# Patient Record
Sex: FEMALE | Race: WHITE | NOT HISPANIC OR LATINO | Employment: UNEMPLOYED | URBAN - METROPOLITAN AREA
[De-identification: names, ages, dates, MRNs, and addresses within clinical notes are randomized per-mention and may not be internally consistent; named-entity substitution may affect disease eponyms.]

---

## 2022-04-10 ENCOUNTER — HISTORICAL (OUTPATIENT)
Dept: ADMINISTRATIVE | Facility: HOSPITAL | Age: 47
End: 2022-04-10

## 2022-04-25 VITALS
DIASTOLIC BLOOD PRESSURE: 70 MMHG | HEIGHT: 63 IN | BODY MASS INDEX: 22.89 KG/M2 | WEIGHT: 129.19 LBS | SYSTOLIC BLOOD PRESSURE: 140 MMHG

## 2022-09-27 PROBLEM — R42 VERTIGO: Status: ACTIVE | Noted: 2022-09-27

## 2022-09-29 ENCOUNTER — PATIENT MESSAGE (OUTPATIENT)
Dept: FAMILY MEDICINE | Facility: CLINIC | Age: 47
End: 2022-09-29
Payer: MEDICAID

## 2022-10-06 ENCOUNTER — NURSE TRIAGE (OUTPATIENT)
Dept: ADMINISTRATIVE | Facility: CLINIC | Age: 47
End: 2022-10-06
Payer: MEDICAID

## 2022-10-06 PROBLEM — J01.00 ACUTE MAXILLARY SINUSITIS: Status: ACTIVE | Noted: 2022-10-06

## 2022-10-07 NOTE — TELEPHONE ENCOUNTER
Patient states history of CVA in January, 2021 and discharge with new prescriptions. Patient states she continues to use discharge prescriptions and called to inquire if she should continue to take them. Patient's Provider is not an [facility] Provider. Patient states her PCP continues to provide refills of her medications in question and she has a follow-up visit scheduled for December, 2022.    Care Advice given to discuss medication regimen with her PCP during follow-up visit or contact for an earlier visit to discuss medications. Patient states understanding of care advice.   Reason for Disposition   [1] Caller has NON-URGENT medicine question about med that PCP prescribed AND [2] triager unable to answer question    Additional Information   Negative: [1] Intentional drug overdose AND [2] suicidal thoughts or ideas   Negative: Drug overdose and triager unable to answer question   Negative: Caller requesting a renewal or refill of a medicine patient is currently taking   Negative: Caller requesting information unrelated to medicine   Negative: Caller requesting information about COVID-19 Vaccine   Negative: Caller requesting information about Emergency Contraception   Negative: Caller requesting information about Combined Birth Control Pills   Negative: Caller requesting information about Progestin Birth Control Pills   Negative: Caller requesting information about Post-Op pain or medicines   Negative: Caller requesting a prescription antibiotic (such as Penicillin) for Strep throat and has a positive culture result   Negative: Caller requesting a prescription anti-viral med (such as Tamiflu) and has influenza (flu) symptoms   Negative: Immunization reaction suspected   Negative: Rash while taking a medicine or within 3 days of stopping it   Negative: [1] Asthma and [2] having symptoms of asthma (cough, wheezing, etc.)   Negative: [1] Symptom of illness (e.g., headache, abdominal pain, earache, vomiting) AND [2]  more than mild   Negative: Breastfeeding questions about mother's medicines and diet   Negative: MORE THAN A DOUBLE DOSE of a prescription or over-the-counter (OTC) drug   Negative: [1] DOUBLE DOSE (an extra dose or lesser amount) of prescription drug AND [2] any symptoms (e.g., dizziness, nausea, pain, sleepiness)   Negative: [1] DOUBLE DOSE (an extra dose or lesser amount) of over-the-counter (OTC) drug AND [2] any symptoms (e.g., dizziness, nausea, pain, sleepiness)   Negative: Took another person's prescription drug   Negative: [1] DOUBLE DOSE (an extra dose or lesser amount) of prescription drug AND [2] NO symptoms (Exception: a double dose of antibiotics)   Negative: Diabetes drug error or overdose (e.g., took wrong type of insulin or took extra dose)   Negative: [1] Prescription not at pharmacy AND [2] was prescribed by PCP recently (Exception: triager has access to EMR and prescription is recorded there. Go to Home Care and confirm for pharmacy.)   Negative: [1] Pharmacy calling with prescription question AND [2] triager unable to answer question   Negative: [1] Caller has URGENT medicine question about med that PCP or specialist prescribed AND [2] triager unable to answer question   Negative: Medicine patch causing local rash or itching   Negative: [1] Caller has medicine question about med NOT prescribed by PCP AND [2] triager unable to answer question (e.g., compatibility with other med, storage)   Negative: Prescription request for new medicine (not a refill)    Protocols used: Medication Question Call-A-

## 2022-10-11 ENCOUNTER — OFFICE VISIT (OUTPATIENT)
Dept: PRIMARY CARE CLINIC | Facility: CLINIC | Age: 47
End: 2022-10-11
Payer: MEDICAID

## 2022-10-11 DIAGNOSIS — H65.93 FLUID LEVEL BEHIND TYMPANIC MEMBRANE OF BOTH EARS: ICD-10-CM

## 2022-10-11 DIAGNOSIS — J01.80 ACUTE NON-RECURRENT SINUSITIS OF OTHER SINUS: Primary | ICD-10-CM

## 2022-10-11 PROCEDURE — 99213 PR OFFICE/OUTPT VISIT, EST, LEVL III, 20-29 MIN: ICD-10-PCS | Mod: 95,,, | Performed by: FAMILY MEDICINE

## 2022-10-11 PROCEDURE — 99213 OFFICE O/P EST LOW 20 MIN: CPT | Mod: 95,,, | Performed by: FAMILY MEDICINE

## 2022-10-11 RX ORDER — METHYLPREDNISOLONE 4 MG/1
TABLET ORAL
Qty: 30 TABLET | Refills: 0 | Status: SHIPPED | OUTPATIENT
Start: 2022-10-11 | End: 2022-11-07

## 2022-12-28 ENCOUNTER — OFFICE VISIT (OUTPATIENT)
Dept: PRIMARY CARE CLINIC | Facility: CLINIC | Age: 47
End: 2022-12-28
Payer: MEDICAID

## 2022-12-28 DIAGNOSIS — J06.9 URI, ACUTE: Primary | ICD-10-CM

## 2022-12-28 PROCEDURE — 99212 PR OFFICE/OUTPT VISIT, EST, LEVL II, 10-19 MIN: ICD-10-PCS | Mod: 95,,, | Performed by: FAMILY MEDICINE

## 2022-12-28 PROCEDURE — 99212 OFFICE O/P EST SF 10 MIN: CPT | Mod: 95,,, | Performed by: FAMILY MEDICINE

## 2022-12-28 NOTE — PROGRESS NOTES
The patient location is: [place]  The chief complaint leading to consultation is: sinus infection    Visit type: audiovisual    Face to Face time with patient:   8 minutes of total time spent on the encounter, which includes face to face time and non-face to face time preparing to see the patient (eg, review of tests), Obtaining and/or reviewing separately obtained history, Documenting clinical information in the electronic or other health record, Independently interpreting results (not separately reported) and communicating results to the patient/family/caregiver, or Care coordination (not separately reported).         Each patient to whom he or she provides medical services by telemedicine is:  (1) informed of the relationship between the physician and patient and the respective role of any other health care provider with respect to management of the patient; and (2) notified that he or she may decline to receive medical services by telemedicine and may withdraw from such care at any time.    Notes:    History of present illness:  Patient is a 47-year-old female who presents with acute onset of sore throat, low-grade subjective fever a little over 100°,  dry cough, runny nose, and nasal congestion 5 days ago.  The fever lasted only 2 days.  She denies facial pain.  She had 2- home COVID test.  She has been taking aleve and cough suppressant.    ROS:  No muscle aches or shortness for breath      Physical exam:  General: Alert, no acute distress   HEENT:  NC/AT, no facial swelling of deformity  Neck:  supple  Resp:  Normal effort, no respiratory distress   Psych:  Normal affect     Diagnoses and all orders for this visit:    URI, acute  Patient reassured.  No antibiotics warranted at this time.  Recommended Flonase, antihistamines and Sudafed as needed for nasal symptoms.   Answers submitted by the patient for this visit:  Ear Pain Questionnaire (Submitted on 12/28/2022)  Chief Complaint: Ear pain  Affected ear:  left  Chronicity: new  Onset: in the past 7 days  Progression since onset: waxing and waning  Frequency: hourly  Fever: no fever  Fever duration: less than 1 day  Pain - numeric: 7/10  abdominal pain: No  ear discharge: No  rash: No  cough: No  headaches: No  rhinorrhea: Yes  diarrhea: No  hearing loss: No  sore throat: Yes  neck pain: No  drainage: No  Treatments tried: NSAIDs  Improvement on treatment: mild  Pain severity: mild  chronic ear infection: No  hearing loss: No  tympanostomy tube: Yes

## 2023-01-09 PROBLEM — J01.00 ACUTE MAXILLARY SINUSITIS: Status: RESOLVED | Noted: 2022-10-06 | Resolved: 2023-01-09

## 2023-01-10 ENCOUNTER — OFFICE VISIT (OUTPATIENT)
Dept: PRIMARY CARE CLINIC | Facility: CLINIC | Age: 48
End: 2023-01-10
Payer: MEDICAID

## 2023-01-10 DIAGNOSIS — J01.81 OTHER ACUTE RECURRENT SINUSITIS: Primary | ICD-10-CM

## 2023-01-10 DIAGNOSIS — J30.89 NON-SEASONAL ALLERGIC RHINITIS DUE TO OTHER ALLERGIC TRIGGER: ICD-10-CM

## 2023-01-10 PROCEDURE — 99213 OFFICE O/P EST LOW 20 MIN: CPT | Mod: 95,,, | Performed by: FAMILY MEDICINE

## 2023-01-10 PROCEDURE — 99213 PR OFFICE/OUTPT VISIT, EST, LEVL III, 20-29 MIN: ICD-10-PCS | Mod: 95,,, | Performed by: FAMILY MEDICINE

## 2023-01-10 RX ORDER — FLUTICASONE PROPIONATE 50 MCG
2 SPRAY, SUSPENSION (ML) NASAL DAILY
Qty: 16 G | Refills: 2 | Status: SHIPPED | OUTPATIENT
Start: 2023-01-10 | End: 2023-02-09

## 2023-01-10 RX ORDER — CETIRIZINE HYDROCHLORIDE 10 MG/1
10 TABLET ORAL DAILY
Qty: 90 TABLET | Refills: 3 | Status: SHIPPED | OUTPATIENT
Start: 2023-01-10 | End: 2024-01-10

## 2023-01-10 NOTE — PROGRESS NOTES
Subjective:    The patient location is: [facility]  Visit type: Virtual visit with synchronous audio and video  Total time spent with patient: 20 mins  Each patient to whom he or she provides medical services by telemedicine is:  (1) informed of the relationship between the physician and patient and the respective role of any other health care provider with respect to management of the patient; and (2) notified that he or she may decline to receive medical services by telemedicine and may withdraw from such care at any time.     Patient ID: Saba Potts is a 47 y.o. female.    Chief Complaint: Sinusitis infection.        History of Present Illness:   Saba Potts 47 y.o. female presents today with    Sinusitis: Patient presents with sinusitis. The patient reports that symptom has been present for one week.  Symptoms include nasal congestion with yellow drainage and rhinorrhea,  subjective fever. Denies sinus pressure, tenderness, facial pain, headache, nasal tenderness, ear fullness, cough, sore throat and sneezing. This is a recurrent problem. Treatment so far includes-  None  Home covid testing for the past 3 days has been negative  No History of allergic rhinitis, asthma and COPD.  Denies  dizziness and ill contact.   She has a PCP-Dr Trinh in [place] that takes care of her medical problems including history of CVA with right sided deficit. She has 6 months routine follow up and her BP is controlled.  I have reviewed all of the patient's clinical history available in care everywhere and Epic and have utilized this in my evaluation and management recommendations today.   No past medical history on file.  No family history on file.  Social History     Socioeconomic History    Marital status: Single     Social Determinants of Health     Financial Resource Strain: Unknown    Difficulty of Paying Living Expenses: Patient refused   Food Insecurity: Unknown    Worried About Running Out of Food in  the Last Year: Patient refused    Ran Out of Food in the Last Year: Patient refused   Transportation Needs: Unknown    Lack of Transportation (Medical): Patient refused    Lack of Transportation (Non-Medical): Patient refused   Physical Activity: Insufficiently Active    Days of Exercise per Week: 2 days    Minutes of Exercise per Session: 20 min   Stress: No Stress Concern Present    Feeling of Stress : Only a little   Social Connections: Unknown    Frequency of Communication with Friends and Family: Patient refused    Frequency of Social Gatherings with Friends and Family: Patient refused    Active Member of Clubs or Organizations: Patient refused    Attends Club or Organization Meetings: Patient refused    Marital Status: Patient refused   Housing Stability: Unknown    Unable to Pay for Housing in the Last Year: Patient refused    Unstable Housing in the Last Year: Patient refused     Outpatient Encounter Medications as of 1/10/2023   Medication Sig Dispense Refill    cetirizine (ZYRTEC) 10 MG tablet Take 1 tablet (10 mg total) by mouth once daily. 90 tablet 3    fluticasone propionate (FLONASE) 50 mcg/actuation nasal spray 2 sprays (100 mcg total) by Each Nostril route once daily. 16 g 2    meclizine (ANTIVERT) 25 mg tablet Take 1 tablet (25 mg total) by mouth 3 (three) times daily as needed for Dizziness or Nausea. 21 tablet 0    [DISCONTINUED] cetirizine (ZYRTEC) 10 MG tablet Take 1 tablet (10 mg total) by mouth once daily. 90 tablet 3     No facility-administered encounter medications on file as of 1/10/2023.       Review of Systems    Review of Systems      A complete 10 point ROS was completed and are positive as per above HPI.    Otherwise negative for fever, diplopia, chest pain, shortness of breath, vomiting, blood in urine, joint pain, skin rash, seizures and unusual bleeding.     Objective:      There were no vitals taken for this visit.  Physical Exam    CONSTITUTIONAL: No apparent distress. Appears  comfortable. Does not appear acutely ill or septic. Appears adequately hydrated.  CARDIOVASCULAR: No perioral cyanosis  PULMONARY: Breathing unlabored. No retractions Chest expansion grossly normal.  PSYCHIATRIC: Alert and conversant and grossly oriented. Mood is grossly neutral. Affect appropriate. Judgment and insight grossly intact.  NEUROLOGIC: No focal sensory deficits reported.   No results found for this or any previous visit.  Assessment:       1. Other acute recurrent sinusitis    2. Non-seasonal allergic rhinitis due to other allergic trigger        Plan:   Other acute recurrent sinusitis  -     fluticasone propionate (FLONASE) 50 mcg/actuation nasal spray; 2 sprays (100 mcg total) by Each Nostril route once daily.  Dispense: 16 g; Refill: 2  -     cetirizine (ZYRTEC) 10 MG tablet; Take 1 tablet (10 mg total) by mouth once daily.  Dispense: 90 tablet; Refill: 3    Non-seasonal allergic rhinitis due to other allergic trigger                    Viral infection, no abx waranted.              -  Recommended supportive care (rest, increase fluids, cough med, gargle with warm salt water, Alternate Tylenol and Motrin every 8 hours as needed for fever, aches, or pain)              -   Wash hands frequently              -  Discussed return or see PCP if symptoms greater than 7 days, change in mucous color, fever)   See PCP for BP             -  Treatment options and alternatives were discussed with the patient. Patient was given ample time to ask questions. All questions were answered. Voices understanding and acceptance of this advice. Will call back if any further questions or concerns.  Perri Rm MD

## 2023-05-09 ENCOUNTER — NURSE TRIAGE (OUTPATIENT)
Dept: ADMINISTRATIVE | Facility: CLINIC | Age: 48
End: 2023-05-09
Payer: MEDICAID

## 2023-05-09 NOTE — TELEPHONE ENCOUNTER
Lab work and urine test was done on last week. Pt would like to know what's the meaning of her results. Informed pt she would need to speak with Md office.   Reason for Disposition   Lab result questions   Caller requesting lab results  (Exception: Routine or non-urgent lab result.)    Protocols used: Information Only Call-A-AH, PCP Call - No Triage-A-AH

## 2023-06-05 DIAGNOSIS — Z12.31 OTHER SCREENING MAMMOGRAM: Primary | ICD-10-CM

## 2023-06-08 ENCOUNTER — HOSPITAL ENCOUNTER (OUTPATIENT)
Dept: RADIOLOGY | Facility: HOSPITAL | Age: 48
Discharge: HOME | End: 2023-06-08
Attending: PEDIATRICS
Payer: MEDICAID

## 2023-06-08 DIAGNOSIS — Z12.31 OTHER SCREENING MAMMOGRAM: ICD-10-CM

## 2023-06-08 PROCEDURE — 77067 SCR MAMMO BI INCL CAD: CPT | Mod: TC

## 2023-06-08 PROCEDURE — 77063 MAMMO DIGITAL SCREENING BILAT WITH TOMO: ICD-10-PCS | Mod: 26,,, | Performed by: STUDENT IN AN ORGANIZED HEALTH CARE EDUCATION/TRAINING PROGRAM

## 2023-06-08 PROCEDURE — 77063 BREAST TOMOSYNTHESIS BI: CPT | Mod: 26,,, | Performed by: STUDENT IN AN ORGANIZED HEALTH CARE EDUCATION/TRAINING PROGRAM

## 2023-06-08 PROCEDURE — 77067 SCR MAMMO BI INCL CAD: CPT | Mod: 26,,, | Performed by: STUDENT IN AN ORGANIZED HEALTH CARE EDUCATION/TRAINING PROGRAM

## 2023-06-08 PROCEDURE — 77067 MAMMO DIGITAL SCREENING BILAT WITH TOMO: ICD-10-PCS | Mod: 26,,, | Performed by: STUDENT IN AN ORGANIZED HEALTH CARE EDUCATION/TRAINING PROGRAM

## 2023-10-30 DIAGNOSIS — H91.90 HEARING LOSS: Primary | ICD-10-CM

## 2023-12-01 DIAGNOSIS — H90.3 SENSORY HEARING LOSS, BILATERAL: Primary | ICD-10-CM

## 2023-12-26 ENCOUNTER — HOSPITAL ENCOUNTER (OUTPATIENT)
Dept: RADIOLOGY | Facility: HOSPITAL | Age: 48
Discharge: HOME | End: 2023-12-26
Attending: FAMILY MEDICINE
Payer: MEDICAID

## 2023-12-26 DIAGNOSIS — H90.3 SENSORY HEARING LOSS, BILATERAL: ICD-10-CM

## 2023-12-26 PROCEDURE — 70553 MRI BRAIN STEM W/O & W/DYE: CPT | Mod: TC

## 2023-12-26 PROCEDURE — 25500020 PHARM REV CODE 255: Performed by: FAMILY MEDICINE

## 2023-12-26 PROCEDURE — A9577 INJ MULTIHANCE: HCPCS | Performed by: FAMILY MEDICINE

## 2023-12-26 RX ADMIN — GADOBENATE DIMEGLUMINE 10 ML: 529 INJECTION, SOLUTION INTRAVENOUS at 10:12

## 2024-01-29 ENCOUNTER — ON-DEMAND VIRTUAL (OUTPATIENT)
Dept: URGENT CARE | Facility: CLINIC | Age: 49
End: 2024-01-29
Payer: MEDICAID

## 2024-01-29 DIAGNOSIS — J06.9 VIRAL URI WITH COUGH: Primary | ICD-10-CM

## 2024-01-29 PROCEDURE — 99202 OFFICE O/P NEW SF 15 MIN: CPT | Mod: 95,,, | Performed by: NURSE PRACTITIONER

## 2024-01-29 RX ORDER — PREDNISONE 20 MG/1
20 TABLET ORAL DAILY
Qty: 3 TABLET | Refills: 0 | Status: SHIPPED | OUTPATIENT
Start: 2024-01-29 | End: 2024-02-01

## 2024-01-29 RX ORDER — AZELASTINE 1 MG/ML
1 SPRAY, METERED NASAL 2 TIMES DAILY
Qty: 30 ML | Refills: 0 | Status: SHIPPED | OUTPATIENT
Start: 2024-01-29 | End: 2025-01-28

## 2024-01-29 RX ORDER — GUAIFENESIN 600 MG/1
1200 TABLET, EXTENDED RELEASE ORAL 2 TIMES DAILY
Qty: 20 TABLET | Refills: 0 | Status: SHIPPED | OUTPATIENT
Start: 2024-01-29 | End: 2024-02-03

## 2024-01-29 RX ORDER — BENZONATATE 100 MG/1
100 CAPSULE ORAL 3 TIMES DAILY PRN
Qty: 21 CAPSULE | Refills: 0 | Status: SHIPPED | OUTPATIENT
Start: 2024-01-29 | End: 2024-02-05

## 2024-01-29 NOTE — PROGRESS NOTES
Subjective:      Patient ID: Saba Potts is a 48 y.o. female.    Vitals:  vitals were not taken for this visit.     Chief Complaint: Sinus Problem      Visit Type: TELE AUDIOVISUAL    Present with the patient at the time of consultation: TELEMED PRESENT WITH PATIENT: None    Past Medical History:   Diagnosis Date    Hyperlipidemia     Hypertension     Stroke      History reviewed. No pertinent surgical history.  Review of patient's allergies indicates:  No Known Allergies  Current Outpatient Medications on File Prior to Visit   Medication Sig Dispense Refill    cetirizine (ZYRTEC) 10 MG tablet Take 1 tablet (10 mg total) by mouth once daily. 90 tablet 3    meclizine (ANTIVERT) 25 mg tablet Take 1 tablet (25 mg total) by mouth 3 (three) times daily as needed for Dizziness or Nausea. 21 tablet 0     No current facility-administered medications on file prior to visit.     History reviewed. No pertinent family history.    Medications Ordered                [facility] - [address]   [address] [address]    Telephone: [phone]   Fax: [phone]   Hours: Not open 24 hours                         E-Prescribed (4 of 4)              azelastine (ASTELIN) 137 mcg (0.1 %) nasal spray    Si spray (137 mcg total) by Nasal route 2 (two) times daily.       Start: 24     Quantity: 30 mL Refills: 0                         benzonatate (TESSALON) 100 MG capsule    Sig: Take 1 capsule (100 mg total) by mouth 3 (three) times daily as needed for Cough.       Start: 24     Quantity: 21 capsule Refills: 0                         guaiFENesin (MUCINEX) 600 mg 12 hr tablet    Sig: Take 2 tablets (1,200 mg total) by mouth 2 (two) times daily. for 5 days       Start: 24     Quantity: 20 tablet Refills: 0                         predniSONE (DELTASONE) 20 MG tablet    Sig: Take 1 tablet (20 mg total) by mouth once daily. for 3 days       Start: 24     Quantity: 3 tablet  Refills: 0                           Ohs Peq Odvv Intake    1/29/2024  7:48 AM CST - Filed by Patient   What is your current physical address in the event of a medical emergency? [address] [place]   Are you able to take your vital signs? Yes   Systolic Blood Pressure: 127   Diastolic Blood Pressure: 83   Weight: 140   Height: 64   Pulse: 77   Temperature: 98   Respiration rate:    Pulse Oxygen: 99   Please attach any relevant images or files          Reports 1 week of postnasal drip, cough, and spitting up green phlegm. Denies fever/chills. Home covid test was negative. Took robitussin dm with some relief.    Sinus Problem  Associated symptoms include coughing and sneezing. Pertinent negatives include no chills, headaches or sore throat.       Constitution: Negative for chills and fever.   HENT:  Positive for postnasal drip. Negative for sinus pain and sore throat.    Respiratory:  Positive for cough and sputum production.    Allergic/Immunologic: Positive for sneezing.   Neurological:  Negative for headaches.        Objective:   The physical exam was conducted virtually.  Physical Exam   Constitutional: She is oriented to person, place, and time. No distress.   HENT:   Head: Normocephalic.   Nose: Congestion present.   Eyes: Conjunctivae are normal. No scleral icterus.   Pulmonary/Chest: Effort normal. No respiratory distress. She has no wheezes.   Musculoskeletal: Normal range of motion.         General: Normal range of motion.   Neurological: She is alert and oriented to person, place, and time.   Skin: Skin is not diaphoretic.   Psychiatric: Her behavior is normal. Judgment and thought content normal.       Assessment:     1. Viral URI with cough        Plan:       Viral URI with cough    Other orders  -     benzonatate (TESSALON) 100 MG capsule; Take 1 capsule (100 mg total) by mouth 3 (three) times daily as needed for Cough.  Dispense: 21 capsule; Refill: 0  -     guaiFENesin (MUCINEX) 600 mg 12 hr  "tablet; Take 2 tablets (1,200 mg total) by mouth 2 (two) times daily. for 5 days  Dispense: 20 tablet; Refill: 0  -     predniSONE (DELTASONE) 20 MG tablet; Take 1 tablet (20 mg total) by mouth once daily. for 3 days  Dispense: 3 tablet; Refill: 0  -     azelastine (ASTELIN) 137 mcg (0.1 %) nasal spray; 1 spray (137 mcg total) by Nasal route 2 (two) times daily.  Dispense: 30 mL; Refill: 0      Rest. Increase fluid intake.    Runny nose: Ensure you are blowing your nose frequently. It is better to get the nasal discharge out.    Cough: Cover the cough/cough into the elbow and wash hands often to prevent the spread of germs.  A cough may be the last symptom to go away and may persist for up to 4-6 weeks, so do not be alarmed.    Non-Pharmacological Interventions: Consider humidifiers, maintaining hydration, and elevating the head during sleep to alleviate symptoms.    Congestion: OTC nasal saline into each nostril 1-3 times daily. May also use flonase nasal spray. I especially like Xlear nasal spray to help with congestion.    There are a variety of oral OTC decongestants. Beware of decongestants containing phenylephrine. Studies have shown that these do not work to improve your symptoms.    Nasal decongestants (like Afrin) should only be taken for 1-3 days. If you use these longer than this, you are at high right for "rebound congestion" which means you will continue to be congested.    If headache or fever, take OTC tylenol or ibuprofen as needed per the instructions on the label.    Sneezing: Antihistamines (Claritin, Allegra, Benadryl, Zyrtec, Xyzal) help with these symptoms. Benadryl, Zyrtec, and Xyzal may cause drowsiness, so avoid operating any heavy machinery or driving while on these medications.    Follow-up with your PCP for recheck in 1-3 days.    All questions were answered to the best of my abilities and all concerns were addressed at this time.     Follow up:   1. Please schedule a virtual follow up " visit as needed.  2. Please see an in-person provider if your symptoms are worsening or not improving in 2-3 days.  3. Please print a copy of this note and send it to your regular doctor or take it to your next visit so it may be included in your medical record.   4. You must understand that you've received Telehealth Urgent Care treatment only and that you may be released before all your medical problems are known or treated. You, the patient, will arrange for follow up care as instructed.  5. Follow up with your PCP or specialty clinic as directed. To schedule an appointment with the appropriate provider with [facility], please call [phone]. For pediatric referrals, please call [phone]  6. Contact customer support at [phone] for questions or concerns  7. For prescription questions or problems, call [phone] anytime for assistance.  8. For [facility] support, call [phone].

## 2024-01-30 ENCOUNTER — ON-DEMAND VIRTUAL (OUTPATIENT)
Dept: URGENT CARE | Facility: CLINIC | Age: 49
End: 2024-01-30
Payer: MEDICAID

## 2024-01-30 DIAGNOSIS — J06.9 UPPER RESPIRATORY TRACT INFECTION, UNSPECIFIED TYPE: Primary | ICD-10-CM

## 2024-01-30 PROCEDURE — 99212 OFFICE O/P EST SF 10 MIN: CPT | Mod: 95,,, | Performed by: NURSE PRACTITIONER

## 2024-01-30 NOTE — PROGRESS NOTES
Subjective:      Patient ID: Saba Potts is a 48 y.o. female.    Vitals:  vitals were not taken for this visit.     Chief Complaint: Sinus Problem      Visit Type: TELE AUDIOVISUAL    Present with the patient at the time of consultation: TELEMED PRESENT WITH PATIENT: None    Past Medical History:   Diagnosis Date    Hyperlipidemia     Hypertension     Stroke      History reviewed. No pertinent surgical history.  Review of patient's allergies indicates:  No Known Allergies  Current Outpatient Medications on File Prior to Visit   Medication Sig Dispense Refill    azelastine (ASTELIN) 137 mcg (0.1 %) nasal spray 1 spray (137 mcg total) by Nasal route 2 (two) times daily. 30 mL 0    benzonatate (TESSALON) 100 MG capsule Take 1 capsule (100 mg total) by mouth 3 (three) times daily as needed for Cough. 21 capsule 0    cetirizine (ZYRTEC) 10 MG tablet Take 1 tablet (10 mg total) by mouth once daily. 90 tablet 3    guaiFENesin (MUCINEX) 600 mg 12 hr tablet Take 2 tablets (1,200 mg total) by mouth 2 (two) times daily. for 5 days 20 tablet 0    meclizine (ANTIVERT) 25 mg tablet Take 1 tablet (25 mg total) by mouth 3 (three) times daily as needed for Dizziness or Nausea. 21 tablet 0    predniSONE (DELTASONE) 20 MG tablet Take 1 tablet (20 mg total) by mouth once daily. for 3 days 3 tablet 0     No current facility-administered medications on file prior to visit.     History reviewed. No pertinent family history.        Ohs Peq Odvv Intake    1/30/2024  8:31 AM CST - Filed by Patient   What is your current physical address in the event of a medical emergency? [address] [place]   Are you able to take your vital signs? Yes   Systolic Blood Pressure: 129   Diastolic Blood Pressure: 84   Weight: 140   Height: 64   Pulse: 94   Temperature:    Respiration rate:    Pulse Oxygen:    Please attach any relevant images or files          URI symptoms for 5 days. No known sick contacts. Seen yesterday for the same. Rxs given  and started. COVID negative x 2. Concerned for a sinus infection and seeking further treatment options.    Sinus Problem  Associated symptoms include congestion, coughing and sinus pressure. Pertinent negatives include no ear pain, shortness of breath or sore throat.       Constitution: Positive for fever (101).   HENT:  Positive for congestion and sinus pressure. Negative for ear pain, dental problem, sinus pain and sore throat.    Respiratory:  Positive for cough. Negative for shortness of breath and wheezing.    Gastrointestinal:  Negative for nausea, vomiting and diarrhea.   Musculoskeletal:  Negative for muscle ache.        Objective:   The physical exam was conducted virtually.  Physical Exam   Constitutional: She is oriented to person, place, and time. She does not appear ill. No distress.   HENT:   Head: Normocephalic and atraumatic.   Nose: Nose normal.   Eyes: Extraocular movement intact   Pulmonary/Chest: Effort normal.   Abdominal: Normal appearance.   Musculoskeletal: Normal range of motion.         General: Normal range of motion.   Neurological: no focal deficit. She is alert and oriented to person, place, and time.   Psychiatric: Her behavior is normal. Mood normal.   Vitals reviewed.      Assessment:     1. Upper respiratory tract infection, unspecified type        Plan:   Continue current meds as prescribed. Reassurance given and follow-up discussed.    Patient encouraged to monitor symptoms closely and instructed to follow-up for new or worsening symptoms. Further, in-person, evaluation may be necessary for continued treatment. Please follow up with your primary care doctor or specialist as needed. Verbally discussed plan. Patient confirms understanding and is in agreement with treatment and plan.     You must understand that you've received a [facility] Care evaluation only and that you may be released before all your medical problems are known or treated. You, the patient, will arrange for follow  up care as instructed.      Upper respiratory tract infection, unspecified type      Patient Instructions   OVER THE COUNTER RECOMMENDATIONS/SUGGESTIONS (IF NO CONTRAINDICATIONS).     ·         Make sure to stay well hydrated.     ·         Use Nasal Saline to mechanically move any post nasal drip from your eustachian tube or from the back of your throat.     ·         Use warm saltwater gargles to ease your throat pain. Warm saltwater gargles as needed for sore throat-  1/2 tsp salt to 1 cup warm water, gargle as desired. Warm fluids tend to relieve a sore throat.     .         Throat lozenges, Chloraseptic spray or other over the counter treatments are ok to use as well. Use as directed.     ·         Use an antihistamine such as Claritin, Zyrtec or Allegra to dry you out.     ·         Use pseudoephedrine (behind the counter) to decongest. Pseudoephedrine  30 mg up to 240 mg /day. It can raise your blood pressure and give you palpitations.     ·         Use Mucinex (guaifenesin) to break up mucous up to 2400mg/day to loosen any mucous.     ·         The Mucinex DM pill has a cough suppressant that can be sedating. It can be used at night to stop the tickle at the back of your throat.     ·         You can use Mucinex D (it has guaifenesin and a high dose of pseudoephedrine) in the mornings to help decongest.     ·         Use Afrin (oxymetazoline) in each nare for no longer than 3 days, as it is addictive. It can also dry out your mucous membranes and cause elevated blood pressure. This is especially useful if you are flying.     ·         Use Flonase 1-2 sprays/nostril per day. It is a local acting steroid nasal spray, if you develop a bloody nose, stop using the medication immediately.     ·         Sometimes Nyquil at night is beneficial to help you get some rest, however it is sedating, and it does have an antihistamine, and Tylenol.     ·         Honey is a natural cough suppressant that can be used.     ·          Tylenol up to 4,000 mg a day is safe for short periods and can be used for body aches, pain, and fever. However, in high doses and prolonged use it can cause liver irritation.     ·         Ibuprofen is a non-steroidal anti-inflammatory that can be used for body aches, pain, and fever. However, it can also cause stomach irritation if overused.

## 2024-08-05 ENCOUNTER — HOSPITAL ENCOUNTER (OUTPATIENT)
Dept: RADIOLOGY | Facility: HOSPITAL | Age: 49
Discharge: HOME | End: 2024-08-05
Attending: PEDIATRICS
Payer: MEDICAID

## 2024-08-05 ENCOUNTER — NURSE TRIAGE (OUTPATIENT)
Dept: ADMINISTRATIVE | Facility: CLINIC | Age: 49
End: 2024-08-05
Payer: MEDICAID

## 2024-08-05 DIAGNOSIS — M25.571 RIGHT ANKLE PAIN: ICD-10-CM

## 2024-08-05 PROCEDURE — 73610 X-RAY EXAM OF ANKLE: CPT | Mod: TC,RT

## 2024-09-06 ENCOUNTER — HOSPITAL ENCOUNTER (OUTPATIENT)
Dept: RADIOLOGY | Facility: HOSPITAL | Age: 49
Discharge: HOME | End: 2024-09-06
Attending: PEDIATRICS
Payer: MEDICAID

## 2024-09-06 DIAGNOSIS — Z12.31 ENCOUNTER FOR SCREENING MAMMOGRAM FOR BREAST CANCER: ICD-10-CM

## 2024-09-06 PROCEDURE — 77067 SCR MAMMO BI INCL CAD: CPT | Mod: 26,,, | Performed by: STUDENT IN AN ORGANIZED HEALTH CARE EDUCATION/TRAINING PROGRAM

## 2024-09-06 PROCEDURE — 77063 BREAST TOMOSYNTHESIS BI: CPT | Mod: TC

## 2024-09-06 PROCEDURE — 77067 SCR MAMMO BI INCL CAD: CPT | Mod: TC

## 2024-09-06 PROCEDURE — 77063 BREAST TOMOSYNTHESIS BI: CPT | Mod: 26,,, | Performed by: STUDENT IN AN ORGANIZED HEALTH CARE EDUCATION/TRAINING PROGRAM

## 2024-10-08 ENCOUNTER — ON-DEMAND VIRTUAL (OUTPATIENT)
Dept: URGENT CARE | Facility: CLINIC | Age: 49
End: 2024-10-08
Payer: MEDICAID

## 2024-10-08 DIAGNOSIS — J06.9 URI WITH COUGH AND CONGESTION: Primary | ICD-10-CM

## 2024-10-08 PROCEDURE — 99213 OFFICE O/P EST LOW 20 MIN: CPT | Mod: 95,,, | Performed by: NURSE PRACTITIONER

## 2024-10-08 RX ORDER — PREDNISONE 20 MG/1
20 TABLET ORAL DAILY
Qty: 3 TABLET | Refills: 0 | Status: SHIPPED | OUTPATIENT
Start: 2024-10-08 | End: 2024-10-11

## 2024-10-08 RX ORDER — FLUTICASONE PROPIONATE 50 MCG
1 SPRAY, SUSPENSION (ML) NASAL DAILY
Qty: 16 G | Refills: 0 | Status: SHIPPED | OUTPATIENT
Start: 2024-10-08

## 2024-10-08 NOTE — PROGRESS NOTES
Subjective:      Patient ID: Saba Potts is a 49 y.o. female.    Vitals:  vitals were not taken for this visit.     Chief Complaint: URI      Visit Type: TELE AUDIOVISUAL - This visit was conducted virtually based on  subjective information and limited objective exam    Present with the patient at the time of consultation: TELEMED PRESENT WITH PATIENT: None  Two patient identifiers used to verify patient- saying out date of birth and full name.       Past Medical History:   Diagnosis Date    Hyperlipidemia     Hypertension     Stroke      History reviewed. No pertinent surgical history.  Review of patient's allergies indicates:  No Known Allergies  Current Outpatient Medications on File Prior to Visit   Medication Sig Dispense Refill    azelastine (ASTELIN) 137 mcg (0.1 %) nasal spray 1 spray (137 mcg total) by Nasal route 2 (two) times daily. 30 mL 0    cetirizine (ZYRTEC) 10 MG tablet Take 1 tablet (10 mg total) by mouth once daily. 90 tablet 3    meclizine (ANTIVERT) 25 mg tablet Take 1 tablet (25 mg total) by mouth 3 (three) times daily as needed for Dizziness or Nausea. 21 tablet 0     No current facility-administered medications on file prior to visit.     No family history on file.    Medications Ordered                [facility] - [address]   [address] [address]    Telephone: [phone]   Fax: [phone]   Hours: Not open 24 hours                         E-Prescribed (2 of 2)              fluticasone propionate (FLONASE) 50 mcg/actuation nasal spray    Si spray (50 mcg total) by Each Nostril route once daily.       Start: 10/8/24     Quantity: 16 g Refills: 0                         predniSONE (DELTASONE) 20 MG tablet    Sig: Take 1 tablet (20 mg total) by mouth once daily. for 3 days       Start: 10/8/24     Quantity: 3 tablet Refills: 0                           Ohs Peq Odvv Intake    10/8/2024  9:24 AM CDT - Filed by Patient   What is your  current physical address in the event of a medical emergency? [address]   Are you able to take your vital signs? No   Please attach any relevant images or files    Is your employer contracted with [facility]? No         48 yo female with c/o cough, fever, and runny nose for 4 days. She states nose is like a faucet. She has tried ibuprofen and cough medication. She denies sob and wheezing. She states no sore throat, she states scratchy and itchy but resolved.         Constitution: Negative. Negative for fever.   HENT: Negative.     Cardiovascular: Negative.    Respiratory: Negative.     Gastrointestinal: Negative.    Endocrine: negative.   Genitourinary: Negative.  Negative for frequency and urgency.   Musculoskeletal: Negative.    Skin: Negative.    Allergic/Immunologic: Negative.    Neurological: Negative.    Hematologic/Lymphatic: Negative.    Psychiatric/Behavioral: Negative.          Objective:   The physical exam was conducted virtually.  LOCATION OF PATIENT [place]  AAO x 3 ; no acute distress noted; appearance normal; mood and behavior normal; thought process normal  Head- normocephalic  Nose- appears normal, no discharge or erythema  Eyes- pupils appear normal in size, no drainage, no erythema  Ears- normal appearing; no discharge, no erythema  Mouth- appears normal  Oropharynx- no erythema, lesions  Lungs- breathing at a normal rate, no acute distress noted  Heart- no reports of tachycardia, palpitations, chest pain  Abdomen- non distended, non tender reported by patient  Skin- warm and dry, no erythema or edema noted by patient or visualized  Psych- as above; no si/hi      Assessment:     1. URI with cough and congestion        Plan:     PLEASE READ YOUR DISCHARGE INSTRUCTIONS ENTIRELY AS IT CONTAINS IMPORTANT INFORMATION.      Please drink plenty of fluids.    Please get plenty of rest.    Please return here or go to the Emergency Department for any concerns or worsening of  condition.    Please take an over the counter antihistamine medication (allegra/Claritin/Zyrtec) of your choice as directed.    Try an over the counter decongestant like Mucinex D or Sudafed. You buy this behind the pharmacy counter    If you do have Hypertension or palpitations, it is safe to take Coricidin HBP for relief of sinus symptoms.    If not allergic, please take over the counter Tylenol (Acetaminophen) and/or Motrin (Ibuprofen) as directed for control of pain and/or fever.  Please follow up with your primary care doctor or specialist as needed.    Sore throat recommendations: Warm fluids, warm salt water gargles, throat lozenges, tea, honey, soup, rest, hydration.    Use over the counter flonase: one spray each nostril twice daily OR two sprays each nostril once daily.     Sinus rinses DO NOT USE TAP WATER, if you must, water must be a rolling boil for 1 minute, let it cool, then use.  May use distilled water, or over the counter nasal saline rinses.  Vics vapor rub in shower to help open nasal passages.  May use nasal gel to keep passages moisturized.  May use Nasal saline sprays during the day for added relief of congestion.   For those who go to the gym, please do not use the sauna or steam room now to clear sinuses.    If you  smoke, please stop smoking.      Please return or see your primary care doctor if you develop new or worsening symptoms.     Please arrange follow up with your primary medical clinic as soon as possible. You must understand that you've received an Urgent Care treatment only and that you may be released before all of your medical problems are known or treated. You, the patient, will arrange for follow up as instructed. If your symptoms worsen or fail to improve you should go to the Emergency Room.      ABRS: acute bacterial rhinosinusitis; ARS: acute rhinosinusitis.  The diagnosis of ARS, which may be bacterial or viral, can be made clinically and requires the presence of  "purulent nasal discharge after a watchful waiting for a 7 day period with symptomatic management  and severe congestion and/or facial pain/pressure. The diagnosis of ABRS can also be made clinically and requires that symptoms be present for >=10 days or that signs and symptoms of ARS initially improve but then worsen, typically over a 10-day time period ("double worsening"). For ABRS to be uncomplicated, there should be no evidence of extension of infection beyond the sinuses into the surrounding skin, soft tissue, bone, or central nervous system.   Because a substantial number of patients with clinically diagnosed ABRS improve with supportive care alone, we generally provide symptomatic care and observe patients who can reliably return for follow-up or be in close contact with their providers if additional care is needed within the next 7 days.      Thank you for choosing [facility]!    Our goal in the [facility] is to always provide outstanding medical care. You may receive a survey by mail or e-mail in the next week regarding your experience today. We would greatly appreciate you completing and returning the survey. Your feedback provides us with a way to recognize our staff who provide very good care, and it helps us learn how to improve when your experience was below our aspiration of excellence.         We appreciate you trusting us with your medical care. We hope you feel better soon. We will be happy to take care of you for all of your future medical needs.    You must understand that you've received an Urgent Care treatment only and that you may be released before all your medical problems are known or treated. You, the patient, will arrange for follow up care as instructed.    Follow up with your PCP or specialty clinic as directed in the next 1-2 weeks if not improved or as needed.  You can call [phone] to schedule an appointment with the appropriate " provider.    If your condition worsens we recommend that you receive another evaluation in person, with your primary care provider, urgent care or at the emergency room immediately or contact your primary medical clinics after hours call service to discuss your concerns.         URI with cough and congestion  -     predniSONE (DELTASONE) 20 MG tablet; Take 1 tablet (20 mg total) by mouth once daily. for 3 days  Dispense: 3 tablet; Refill: 0    Other orders  -     fluticasone propionate (FLONASE) 50 mcg/actuation nasal spray; 1 spray (50 mcg total) by Each Nostril route once daily.  Dispense: 16 g; Refill: 0

## 2024-10-16 ENCOUNTER — OFFICE VISIT (OUTPATIENT)
Dept: OBSTETRICS AND GYNECOLOGY | Facility: CLINIC | Age: 49
End: 2024-10-16
Payer: MEDICAID

## 2024-10-16 VITALS
DIASTOLIC BLOOD PRESSURE: 80 MMHG | SYSTOLIC BLOOD PRESSURE: 126 MMHG | HEIGHT: 63 IN | WEIGHT: 141.19 LBS | BODY MASS INDEX: 25.02 KG/M2

## 2024-10-16 DIAGNOSIS — Z80.3 FAMILY HISTORY OF BREAST CANCER: ICD-10-CM

## 2024-10-16 DIAGNOSIS — Z12.4 CERVICAL CANCER SCREENING: ICD-10-CM

## 2024-10-16 DIAGNOSIS — Z01.419 WELL WOMAN EXAM WITH ROUTINE GYNECOLOGICAL EXAM: Primary | ICD-10-CM

## 2024-10-16 PROCEDURE — 87624 HPV HI-RISK TYP POOLED RSLT: CPT

## 2024-10-16 RX ORDER — AMLODIPINE BESYLATE 10 MG/1
TABLET ORAL
COMMUNITY

## 2024-10-16 RX ORDER — HYDRALAZINE HYDROCHLORIDE 50 MG/1
50 TABLET, FILM COATED ORAL 2 TIMES DAILY
COMMUNITY
Start: 2024-09-20

## 2024-10-16 RX ORDER — GEMFIBROZIL 600 MG/1
600 TABLET, FILM COATED ORAL 2 TIMES DAILY
COMMUNITY
Start: 2024-09-20

## 2024-10-16 RX ORDER — BUSPIRONE HYDROCHLORIDE 5 MG/1
2.5-5 TABLET ORAL 2 TIMES DAILY
COMMUNITY
Start: 2024-09-20

## 2024-10-16 RX ORDER — ATORVASTATIN CALCIUM 80 MG/1
80 TABLET, FILM COATED ORAL NIGHTLY
COMMUNITY
Start: 2024-09-20

## 2024-10-16 RX ORDER — SERTRALINE HYDROCHLORIDE 100 MG/1
TABLET, FILM COATED ORAL
COMMUNITY

## 2024-10-16 NOTE — PROGRESS NOTES
"Chief Complaint:   Gynecologic Exam (New patient here for GYN exam and to establish care. Pt reports "I had a stroke 4 years ago and it has been even longer since I have had a GYN exam.")     History of Present Illness:  Saba Potts is a 49 y.o. year old  presents for her well woman exam and establishment of care. Currently relying on condoms for birth control. LMP 10/07/24. Having regular monthly cycles lasting 5 days with normal bleeding. Denies any irregular menstrual bleeding. Denies any complaints today.     PT has a h/o hemorrhagic stroke 4 years ago.     Family h/o breast cancer in paternal grandmother.        Gyn History:  Menstrual History  Cycle: Yes (LMP 10/7/24)  Menarche Age: 12 years  Flow Duration: 5  Flow: Normal  Interval: 28  Intermenstrual Bleeding: No  Dysmenorrhea: Yes  Dysmenorrhea Severity : Mild    Menopause  Menopause Age: 1 years    Pap History  Last pap date:  (10/16/24 "Years ago.")  Result: Normal  History of Abnormal Pap: No  HPV Vaccine Completed: No    [place]  Sexually Active: Yes  Sexual Orientation: heterosexual  Postcoital Bleeding: No  Dyspareunia: No  STI History: No  Contraception: No    Breast History  Last Breast Imaging Date: Yes  Date: 24 (benign)  History of Abnormal Breast Imaging : No  History of Breast Biopsy: No        Review of Systems:  General/Constitutional: Chills denies. Fatigue/weakness denies. Fever denies. Night sweats denies. Hot flashes denies    Respiratory: Cough denies. Hemoptysis denies. SOB denies. Sputum production denies. Wheezing denies .   Cardiovascular: Chest pain denies. Dizziness denies. Palpitations denies. Swelling in hands/feet denies.                Breast: Dimpling denies. Breast mass denies. Breast pain/tenderness denies. Nipple discharge denies. Puckering denies.  Gastrointestinal: Abdominal pain denies. Blood in stool denies. Constipation denies. Diarrhea denies. Heartburn denies. Nausea denies. Vomiting denies  "   Genitourinary: Incontinence denies. Blood in urine denies. Frequent urination denies. Painful urination denies. Urinary urgency denies. Nocturia denies    Gynecologic: Irregular menses denies. Heavy bleeding denies. Painful menses denies. Vaginal discharge denies. Vaginal odor denies. Vaginal itching denies. Vaginal lesion denies. Pelvic pain denies. Decreased libido denies. Vulvar lesion denies. Prolapse of genital organs denies. Painful intercourse denies. Postcoital bleeding denies    Psychiatric: Depression denies. Anxiety denies.     OB History    Para Term  AB Living   1 1 1 0 0 1   SAB IAB Ectopic Multiple Live Births   0 0 0 0 1      # 1 - Date: 95, Sex: Male, Weight: 3.26 kg (7 lb 3 oz), GA: 40w0d, Type: Vaginal, Spontaneous, Apgar1: None, Apgar5: None, Living: Living, Birth Comments: None      Past Medical History:   Diagnosis Date    Amenorrhea     Anxiety     Depression     Hyperlipidemia     Hypertension     Stroke        Current Outpatient Medications:     amLODIPine (NORVASC) 10 MG tablet, , Disp: , Rfl:     atorvastatin (LIPITOR) 80 MG tablet, Take 80 mg by mouth every evening., Disp: , Rfl:     busPIRone (BUSPAR) 5 MG Tab, Take 2.5-5 mg by mouth 2 (two) times daily., Disp: , Rfl:     gemfibroziL (LOPID) 600 MG tablet, Take 600 mg by mouth 2 (two) times daily., Disp: , Rfl:     hydrALAZINE (APRESOLINE) 50 MG tablet, Take 50 mg by mouth 2 (two) times daily., Disp: , Rfl:     sertraline (ZOLOFT) 100 MG tablet, , Disp: , Rfl:     Review of patient's allergies indicates:  No Known Allergies    Past Surgical History:   Procedure Laterality Date    DILATION OF URETER  1983    TONSILLECTOMY, ADENOIDECTOMY, BILATERAL MYRINGOTOMY AND TUBES       Family History   Problem Relation Name Age of Onset    Breast cancer Paternal Grandmother          onset unknown    Hypertension Father Derekdona veraarnoldo      Social History     Socioeconomic History    Marital status: Single   Tobacco Use     "Smoking status: Never    Smokeless tobacco: Never   Substance and Sexual Activity    Alcohol use: Not Currently     Comment: occasional    Drug use: Never    Sexual activity: Yes     Partners: Male     Birth control/protection: Condom     Social Drivers of Health     Financial Resource Strain: Unknown (9/29/2022)    Overall Financial Resource Strain (CARDIA)     Difficulty of Paying Living Expenses: Patient declined   Food Insecurity: Unknown (9/29/2022)    Hunger Vital Sign     Worried About Running Out of Food in the Last Year: Patient declined     Ran Out of Food in the Last Year: Patient declined   Transportation Needs: Unknown (9/29/2022)    PRAPARE - Transportation     Lack of Transportation (Medical): Patient declined     Lack of Transportation (Non-Medical): Patient declined   Physical Activity: Insufficiently Active (9/29/2022)    Exercise Vital Sign     Days of Exercise per Week: 2 days     Minutes of Exercise per Session: 20 min   Stress: No Stress Concern Present (9/29/2022)    [employer] [place] [employer] - Occupational Stress Questionnaire     Feeling of Stress : Only a little   Housing Stability: Unknown (9/29/2022)    Housing Stability Vital Sign     Unable to Pay for Housing in the Last Year: Patient refused     Unstable Housing in the Last Year: Patient refused       Physical Exam:  /80 (BP Location: Left arm, Patient Position: Sitting)   Ht 5' 2.5" (1.588 m)   Wt 64 kg (141 lb 3.2 oz)   LMP 10/07/2024 (Exact Date)   BMI 25.41 kg/m²     Chaperone: present.       General appearance: healthy, well-nourished and well-developed     Psychiatric: Orientation to time, place and person. Normal mood and affect and active, alert     Skin: Appearance: no rashes or lesions.     Neck:   Neck: supple, FROM, trachea midline. and no masses   Thyroid: no enlargement or nodules and non-tender.       Cardiovascular:   Auscultation: RRR and no murmur.   Peripheral Vascular: no varicosities, LLE " edema, RLE edema, calf tenderness, and palpable cord and pedal pulses intact.     Lungs:   Respiratory effort: no intercostal retractions or accessory muscle usage.   Auscultation: no wheezing, rales/crackles, or rhonchi and clear to auscultation.     Breast:   Inspection/Palpation: no tenderness, discrete/distinct masses, skin changes, or abnormal secretions. Nipple appearance normal.     Abdomen:   Auscultation/Inspection/Palpation: no hepatomegaly, splenomegaly, masses, tenderness or CVA tenderness and soft, non-distended bowel sounds preset.    Hernia: no palpable hernias.     Female Genitalia:    Vulva: no masses, tenderness or lesions    Bladder/Urethra: no urethral discharge or mass, normal meatus, bladder non-distended.    Vagina: no tenderness, erythema, cystocele, rectocele, abnormal vaginal discharge or vesicle(s) or ulcers    Cervix: no discharge, no cervical lacerations noted or motion tenderness and grossly normal    Uterus: normal size and shape and midline, non-tender, and no uterine prolapse.    Adnexa/Parametria: no parametrial tenderness or mass, no adnexal tenderness or ovarian mass.     Lymph Nodes:   Palpation: non tender submandibular nodes, axillary nodes, or inguinal nodes.     Rectal Exam:   Rectum: normal perianal skin.       Assessment/Plan:  1. Well woman exam with routine gynecological exam  Pap   Recommend BSE monthly   Discussed recommendations of annual screening after age of 40 with mammogram  Explained that screening is not 100% reliable. Advised patient if she notices any changes to her breast including a lump, mass, dimpling, discharge, rash, or tenderness she should contact us immediately.     Healthy diet, exercise   MM24 benign  Multivitamin   Seat belt   Sunscreen use   Safe sex/ STI education  Contraception: none  Annual labs: per PCP  Colonoscopy: per PCP     2. Family history of breast cancer  -SBE encouraged    3. Cervical cancer screening  -Pap    RTC PRN or for  annual.

## 2024-10-23 ENCOUNTER — TELEPHONE (OUTPATIENT)
Dept: OBSTETRICS AND GYNECOLOGY | Facility: CLINIC | Age: 49
End: 2024-10-23
Payer: MEDICAID

## 2024-10-23 NOTE — TELEPHONE ENCOUNTER
I called patient and she wanted to know her PAP results.     I let her know that her results were NIL -HPV and to repeat pap at annual.     She agrees with the plan of care and verbalized a clear understanding.

## 2024-12-28 ENCOUNTER — ON-DEMAND VIRTUAL (OUTPATIENT)
Dept: URGENT CARE | Facility: CLINIC | Age: 49
End: 2024-12-28
Payer: MEDICAID

## 2024-12-28 DIAGNOSIS — R52 PAIN: Primary | ICD-10-CM

## 2024-12-28 PROCEDURE — 99214 OFFICE O/P EST MOD 30 MIN: CPT | Mod: 95,,, | Performed by: PHYSICIAN ASSISTANT

## 2024-12-28 NOTE — PROGRESS NOTES
Subjective:      Patient ID: Saba Mclaughlin is a 49 y.o. female.    Vitals:  vitals were not taken for this visit.     Chief Complaint: Pain      Visit Type: TELE AUDIOVISUAL    Present with the patient at the time of consultation: TELEMED PRESENT WITH PATIENT: None at home    Past Medical History:   Diagnosis Date    Amenorrhea     Anxiety 1995    Depression     Hyperlipidemia     Hypertension     Stroke      Past Surgical History:   Procedure Laterality Date    DILATION OF URETER  1983    TONSILLECTOMY, ADENOIDECTOMY, BILATERAL MYRINGOTOMY AND TUBES       Review of patient's allergies indicates:  No Known Allergies  Current Outpatient Medications on File Prior to Visit   Medication Sig Dispense Refill    amLODIPine (NORVASC) 10 MG tablet       atorvastatin (LIPITOR) 80 MG tablet Take 80 mg by mouth every evening.      busPIRone (BUSPAR) 5 MG Tab Take 2.5-5 mg by mouth 2 (two) times daily.      gemfibroziL (LOPID) 600 MG tablet Take 600 mg by mouth 2 (two) times daily.      hydrALAZINE (APRESOLINE) 50 MG tablet Take 50 mg by mouth 2 (two) times daily.      sertraline (ZOLOFT) 100 MG tablet        No current facility-administered medications on file prior to visit.     Family History   Problem Relation Name Age of Onset    Breast cancer Paternal Grandmother          onset unknown    Hypertension Father Derek mclaughlin            Ohs Peq Odvv Intake    12/28/2024  7:41 AM CST - Filed by Patient   What is your current physical address in the event of a medical emergency? [address] [place] [id number]   Are you able to take your vital signs? Yes   Systolic Blood Pressure: 124   Diastolic Blood Pressure: 82   Weight: 141   Height: 5   Pulse: 81   Temperature:    Respiration rate:    Pulse Oxygen:    Please attach any relevant images or files    Is your employer contracted with [facility]? No         Pain all over body for 2 days. States it is the worst pain she has had since her stroke. Aches all over.  Not fever or chills. Had some pain when she started statin and now stopped statin but pain is worse.         Constitution: Negative for fatigue and fever.   HENT: Negative.     Respiratory: Negative.     Neurological:  Negative for dizziness, history of vertigo, light-headedness, passing out, facial drooping, speech difficulty, coordination disturbances, loss of balance, headaches, history of migraines, disorientation, altered mental status, loss of consciousness, numbness and tingling.   Psychiatric/Behavioral:  Negative for altered mental status and disorientation.         Objective:   The physical exam was conducted virtually.  Physical Exam   Constitutional: She is oriented to person, place, and time.   Abdominal: Normal appearance.   Neurological: She is alert and oriented to person, place, and time.       Assessment:     1. Pain        Plan:       Pain      Patient advised to go to ER for further work up do to hx of stroke and severe pain after taking statin.

## 2024-12-28 NOTE — PATIENT INSTRUCTIONS
Patient advised to go to ER for further work up do to hx of stroke and severe pain after taking statin.     Thank you for choosing [facility]!    Our goal in the [facility] to always provide outstanding medical care. You may receive a survey by mail or e-mail in the next week regarding your experience today. We would greatly appreciate you completing and returning the survey. Your feedback provides us with a way to recognize our staff who provide very good care, and it helps us learn how to improve when your experience was below our aspiration of excellence.         We appreciate you trusting us with your medical care. We hope you feel better soon. We will be happy to take care of you for all of your future medical needs.    You must understand that you've received Virtual  treatment only and that you may be released before all your medical problems are known or treated. You, the patient, will arrange for follow up care as instructed.    Follow up with your PCP or specialty clinic as directed in the next 1-2 weeks if not improved or as needed.  You can call [phone] to schedule an appointment with the appropriate provider.    If your condition worsens we recommend that you receive another evaluation in person, with your primary care provider, urgent care or at the emergency room immediately or contact your primary medical clinics after hours call service to discuss your concerns.

## 2025-01-24 ENCOUNTER — ON-DEMAND VIRTUAL (OUTPATIENT)
Dept: URGENT CARE | Facility: CLINIC | Age: 50
End: 2025-01-24
Payer: MEDICAID

## 2025-01-24 DIAGNOSIS — J06.9 UPPER RESPIRATORY TRACT INFECTION, UNSPECIFIED TYPE: Primary | ICD-10-CM

## 2025-01-24 RX ORDER — CARBINOXAMINE MALEATE 4 MG/1
1 TABLET ORAL 3 TIMES DAILY PRN
Qty: 20 EACH | Refills: 0 | Status: SHIPPED | OUTPATIENT
Start: 2025-01-24 | End: 2025-01-31

## 2025-01-24 RX ORDER — IPRATROPIUM BROMIDE 21 UG/1
2 SPRAY, METERED NASAL 2 TIMES DAILY
Qty: 30 ML | Refills: 0 | Status: SHIPPED | OUTPATIENT
Start: 2025-01-24 | End: 2025-02-03

## 2025-01-24 NOTE — PROGRESS NOTES
Subjective:      Patient ID: Saba Potts is a 49 y.o. female.    Vitals:  vitals were not taken for this visit.     Chief Complaint: Sinus Problem      Visit Type: TELE AUDIOVISUAL    Patient Location: Home     Present with the patient at the time of consultation: TELEMED PRESENT WITH PATIENT: None    Past Medical History:   Diagnosis Date    Amenorrhea     Anxiety     Depression     Hyperlipidemia     Hypertension     Stroke      Past Surgical History:   Procedure Laterality Date    DILATION OF URETER      TONSILLECTOMY, ADENOIDECTOMY, BILATERAL MYRINGOTOMY AND TUBES       Review of patient's allergies indicates:  No Known Allergies  Current Outpatient Medications on File Prior to Visit   Medication Sig Dispense Refill    amLODIPine (NORVASC) 10 MG tablet       atorvastatin (LIPITOR) 80 MG tablet Take 80 mg by mouth every evening.      busPIRone (BUSPAR) 5 MG Tab Take 2.5-5 mg by mouth 2 (two) times daily.      gemfibroziL (LOPID) 600 MG tablet Take 600 mg by mouth 2 (two) times daily.      hydrALAZINE (APRESOLINE) 50 MG tablet Take 50 mg by mouth 2 (two) times daily.      sertraline (ZOLOFT) 100 MG tablet        No current facility-administered medications on file prior to visit.     Family History   Problem Relation Name Age of Onset    Breast cancer Paternal Grandmother          onset unknown    Hypertension Father Derek potts        Medications Ordered                [facility]  [address]   [address] [address]    Telephone: [phone]   Fax: [phone]   Hours: Not open 24 hours                         E-Prescribed (2 of 2)              carbinoxamine maleate 4 mg Tab    Sig: Take 1 tablet by mouth 3 (three) times daily as needed (congestion).       Start: 25     Quantity: 20 each Refills: 0                         ipratropium (ATROVENT) 21 mcg (0.03 %) nasal spray    Si sprays by Each Nostril route 2 (two) times daily. for 10 days        Start: 1/24/25     Quantity: 30 mL Refills: 0                           Ohs Peq Odvv Intake    1/24/2025  8:56 AM CST - Filed by Patient   What is your current physical address in the event of a medical emergency? [address] [id number]   Are you able to take your vital signs? Yes   Systolic Blood Pressure: 130   Diastolic Blood Pressure: 80   Weight: 140   Height: 5   Pulse: 95   Temperature:    Respiration rate:    Pulse Oxygen:    Please attach any relevant images or files    Is your employer contracted with [facility]? No         Sinus Problem  This is a new problem. Episode onset: 3 days. The problem is unchanged. There has been no fever. Associated symptoms include congestion, ear pain, headaches and sinus pressure. Pertinent negatives include no coughing, sneezing, sore throat or swollen glands. Treatments tried: flonase. The treatment provided mild relief.       HENT:  Positive for ear pain, congestion and sinus pressure. Negative for sore throat.    Respiratory:  Negative for cough.    Allergic/Immunologic: Negative for sneezing.   Neurological:  Positive for headaches.        Objective:   The physical exam was conducted virtually.  Physical Exam  Normal appearance  Sinus congestion    Assessment:     1. Upper respiratory tract infection, unspecified type        Plan:       Upper respiratory tract infection, unspecified type    Other orders  -     ipratropium (ATROVENT) 21 mcg (0.03 %) nasal spray; 2 sprays by Each Nostril route 2 (two) times daily. for 10 days  Dispense: 30 mL; Refill: 0  -     carbinoxamine maleate 4 mg Tab; Take 1 tablet by mouth 3 (three) times daily as needed (congestion).  Dispense: 20 each; Refill: 0

## 2025-02-14 ENCOUNTER — ON-DEMAND VIRTUAL (OUTPATIENT)
Dept: URGENT CARE | Facility: CLINIC | Age: 50
End: 2025-02-14
Payer: MEDICAID

## 2025-02-14 DIAGNOSIS — H92.09 OTALGIA, UNSPECIFIED LATERALITY: Primary | ICD-10-CM

## 2025-02-14 DIAGNOSIS — R09.81 SINUS CONGESTION: ICD-10-CM

## 2025-02-14 RX ORDER — PREDNISONE 20 MG/1
20 TABLET ORAL DAILY
Qty: 3 TABLET | Refills: 0 | Status: SHIPPED | OUTPATIENT
Start: 2025-02-14 | End: 2025-02-17

## 2025-02-14 NOTE — PROGRESS NOTES
Subjective:      Patient ID: Saba Potts is a 49 y.o. female.    Vitals:  vitals were not taken for this visit.     Chief Complaint: Otalgia      Visit Type: TELE AUDIOVISUAL - This visit was conducted virtually based on  subjective information and limited objective exam    Present with the patient at the time of consultation: TELEMED PRESENT WITH PATIENT: None  LOCATION OF PATIENT [place]  Two patient identifiers used to verify patient- saying out date of birth and full name.       Past Medical History:   Diagnosis Date    Amenorrhea     Anxiety 1995    Depression     Hyperlipidemia     Hypertension     Stroke      Past Surgical History:   Procedure Laterality Date    DILATION OF URETER  1983    TONSILLECTOMY, ADENOIDECTOMY, BILATERAL MYRINGOTOMY AND TUBES       Review of patient's allergies indicates:  No Known Allergies  Current Outpatient Medications on File Prior to Visit   Medication Sig Dispense Refill    amLODIPine (NORVASC) 10 MG tablet       atorvastatin (LIPITOR) 80 MG tablet Take 80 mg by mouth every evening.      busPIRone (BUSPAR) 5 MG Tab Take 2.5-5 mg by mouth 2 (two) times daily.      gemfibroziL (LOPID) 600 MG tablet Take 600 mg by mouth 2 (two) times daily.      hydrALAZINE (APRESOLINE) 50 MG tablet Take 50 mg by mouth 2 (two) times daily.      sertraline (ZOLOFT) 100 MG tablet        No current facility-administered medications on file prior to visit.     Family History   Problem Relation Name Age of Onset    Breast cancer Paternal Grandmother          onset unknown    Hypertension Father Derek potts        Medications Ordered                [facility] - [address]   [address] [address]    Telephone: [phone]   Fax: [phone]   Hours: Not open 24 hours                         E-Prescribed (1 of 1)              predniSONE (DELTASONE) 20 MG tablet    Sig: Take 1 tablet (20 mg total) by mouth once daily. for 3 days       Start: 2/14/25      Quantity: 3 tablet Refills: 0                           Ohs Peq Odvv Intake    2/14/2025  8:36 AM CST - Filed by Patient   What is your current physical address in the event of a medical emergency? [address]   Are you able to take your vital signs? No   Please attach any relevant images or files    Is your employer contracted with [facility]? No         50 yo female with c/o ear pain to right ear and congestion since yesterday. She states she has not tried anything for symptoms. She states she had a mri and has cyst in sinuses. She denies fever, sore throat and cough.         Constitution: Negative. Negative for fever.   HENT:  Positive for ear pain and congestion. Negative for sinus pain, sinus pressure and sore throat.    Cardiovascular: Negative.    Respiratory: Negative.  Negative for cough and sputum production.    Gastrointestinal: Negative.    Endocrine: negative.   Genitourinary: Negative.  Negative for frequency and urgency.   Musculoskeletal: Negative.    Skin: Negative.    Allergic/Immunologic: Negative.    Neurological: Negative.    Hematologic/Lymphatic: Negative.    Psychiatric/Behavioral: Negative.          Objective:   The physical exam was conducted virtually.    AAO x 3 ; no acute distress noted; appearance normal; mood and behavior normal; thought process normal  Head- normocephalic  Nose- appears normal, no discharge or erythema  Eyes- pupils appear normal in size, no drainage, no erythema  Ears- normal appearing; no discharge, no erythema  Mouth- appears normal  Oropharynx- no erythema, lesions  Lungs- breathing at a normal rate, no acute distress noted  Heart- no reports of tachycardia, palpitations, chest pain  Abdomen- non distended, non tender reported by patient  Skin- warm and dry, no erythema or edema noted by patient or visualized  Psych- as above; no si/hi      Assessment:     1. Otalgia, unspecified laterality    2. Sinus congestion        Plan:     -Below are  suggestions for symptomatic relief:              -Tylenol every 4 hours OR ibuprofen every 6 hours as needed for pain/fever.              -Salt water gargles to soothe throat pain.              -Chloroseptic spray also helps to numb throat pain.              -Nasal saline spray reduces inflammation and dryness.              -Warm face compresses to help with facial sinus pain/pressure.              -Vicks vapor rub at night.              -Flonase OTC or Nasacort OTC for nasal congestion.              -Simple foods like chicken noodle soup.              -Delsym helps with coughing at night              -Zyrtec/Claritin during the day & Benadryl at night may help with allergies.     If you DO NOT have Hypertension or any history of palpitations, it is ok to take over the counter Sudafed or Mucinex D or Allegra-D or Claritin-D or Zyrtec-D.  If you do take one of the above, it is ok to combine that with plain over the counter Mucinex or Allegra or Claritin or Zyrtec. If, for example, you are taking Zyrtec -D, you can combine that with Mucinex, but not Mucinex-D.  If you are taking Mucinex-D, you can combine that with plain Allegra or Claritin or Zyrtec.   If you DO have Hypertension or palpitations, it is safe to take Coricidin HBP for relief of sinus symptoms.     Please follow up with your Primary care provider within 2-5 days if your signs and symptoms have not resolved or worsen.      If your condition worsens or fails to improve we recommend that you receive another evaluation at the emergency room immediately or contact your primary medical clinic to discuss your concerns.   You must understand that you have received an Urgent Care treatment only and that you may be released before all of your medical problems are known or treated. You, the patient, will arrange for follow up care as instructed.      RED FLAGS/WARNING SYMPTOMS DISCUSSED WITH PATIENT THAT WOULD WARRANT EMERGENT MEDICAL ATTENTION. PATIENT VERBALIZED  UNDERSTANDING.      Thank you for choosing [facility]!    Our goal in the [facility] is to always provide outstanding medical care. You may receive a survey by mail or e-mail in the next week regarding your experience today. We would greatly appreciate you completing and returning the survey. Your feedback provides us with a way to recognize our staff who provide very good care, and it helps us learn how to improve when your experience was below our aspiration of excellence.         We appreciate you trusting us with your medical care. We hope you feel better soon. We will be happy to take care of you for all of your future medical needs.    You must understand that you've received an Urgent Care treatment only and that you may be released before all your medical problems are known or treated. You, the patient, will arrange for follow up care as instructed.    Follow up with your PCP or specialty clinic as directed in the next 1-2 weeks if not improved or as needed.  You can call [phone] to schedule an appointment with the appropriate provider.    If your condition worsens we recommend that you receive another evaluation in person, with your primary care provider, urgent care or at the emergency room immediately or contact your primary medical clinics after hours call service to discuss your concerns.         Otalgia, unspecified laterality  -     predniSONE (DELTASONE) 20 MG tablet; Take 1 tablet (20 mg total) by mouth once daily. for 3 days  Dispense: 3 tablet; Refill: 0    Sinus congestion  -     predniSONE (DELTASONE) 20 MG tablet; Take 1 tablet (20 mg total) by mouth once daily. for 3 days  Dispense: 3 tablet; Refill: 0

## 2025-02-15 ENCOUNTER — ON-DEMAND VIRTUAL (OUTPATIENT)
Dept: URGENT CARE | Facility: CLINIC | Age: 50
End: 2025-02-15
Payer: MEDICAID

## 2025-02-15 DIAGNOSIS — R42 VERTIGO: Primary | ICD-10-CM

## 2025-02-15 RX ORDER — MECLIZINE HYDROCHLORIDE 25 MG/1
25 TABLET ORAL 3 TIMES DAILY PRN
Qty: 15 TABLET | Refills: 0 | Status: SHIPPED | OUTPATIENT
Start: 2025-02-15 | End: 2025-02-20

## 2025-02-15 NOTE — PATIENT INSTRUCTIONS
Increase fluid and rest    Stay hydrated.     Patient encouraged to monitor symptoms closely and instructed to follow-up for new or worsening symptoms.     Further, in-person, evaluation may be necessary for continued treatment. Please follow up with your primary care doctor or specialist as needed. Verbally discussed plan.       We appreciate you trusting us with your medical care. We hope you feel better soon. We will be happy to take care of you for all of your future medical needs.     You must understand that you've received Virtual treatment only and that you may be released before all your medical problems are known or treated. You, the patient, will arrange for follow up care as instructed.     Follow up with your PCP or specialty clinic as directed in the next 1-2 weeks if not improved or as needed. You can call [phone] to schedule an appointment with the appropriate provider.     If your condition worsens we recommend that you receive another evaluation in person, with your primary care provider, urgent care or at the emergency room immediately or contact your primary medical clinics after hours call service to discuss your concerns.

## 2025-02-15 NOTE — PROGRESS NOTES
Subjective:      Patient ID: Saba Potts is a 49 y.o. female.    Vitals:  vitals were not taken for this visit.     Chief Complaint: Dizziness      Visit Type: TELE AUDIOVISUAL    Patient Location[place]     Present with the patient at the time of consultation: TELEMED PRESENT WITH PATIENT: None    Past Medical History:   Diagnosis Date    Amenorrhea     Anxiety 1995    Depression     Hyperlipidemia     Hypertension     Stroke      Past Surgical History:   Procedure Laterality Date    DILATION OF URETER  1983    TONSILLECTOMY, ADENOIDECTOMY, BILATERAL MYRINGOTOMY AND TUBES       Review of patient's allergies indicates:  No Known Allergies  Medications Ordered Prior to Encounter[1]  Family History   Problem Relation Name Age of Onset    Breast cancer Paternal Grandmother          onset unknown    Hypertension Father Derek potts        Medications Ordered                [facility] - [address]   [address] [address]    Telephone: [phone]   Fax: [phone]   Hours: Not open 24 hours                         E-Prescribed (1 of 1)              meclizine (ANTIVERT) 25 mg tablet    Sig: Take 1 tablet (25 mg total) by mouth 3 (three) times daily as needed for Dizziness or Nausea.       Start: 2/15/25     Quantity: 15 tablet Refills: 0                           Ohs Peq Odvv Intake    2/15/2025  8:46 AM CST - Filed by Patient   What is your current physical address in the event of a medical emergency? [address]   Are you able to take your vital signs? No   Please attach any relevant images or files    Is your employer contracted with [facility]? No         Pt presents with c/o dizziness with standing or moving with associated nausea. Noted hx of Vertigo, yesterday virtual visit for ear pain. Denies any Fever, CP, SOB, abdominal pain.     Dizziness:   Chronicity:  New  Onset:  Yesterday  Progression since onset:  Gradually  worsening  Frequency:  Every few minutes  Duration:  Off/on all day   Associated symptoms: ear congestion, ear pain, headaches and nausea.no vomiting, no weakness, no visual disturbances, no syncope, no palpitations, no panic, no facial weakness, no slurred speech, no numbness in extremities and no chest pain.  Aggravated by:  Bending, position changes and getting up      HENT:  Positive for ear pain. Negative for ear discharge.    Cardiovascular:  Negative for chest pain, palpitations and passing out.   Respiratory:  Negative for shortness of breath.    Gastrointestinal:  Positive for nausea. Negative for vomiting.   Neurological:  Positive for dizziness and headaches. Negative for passing out.        Objective:   The physical exam was conducted virtually.  Physical Exam   Constitutional: She is oriented to person, place, and time. No distress.   HENT:   Head: Normocephalic.   Ears:   Right Ear: External ear normal.   Left Ear: External ear normal.   Pulmonary/Chest: Effort normal. No respiratory distress.   Neurological: She is alert and oriented to person, place, and time.       Assessment:     1. Vertigo        Plan:   Increase fluids and rest    Patient encouraged to monitor symptoms closely and instructed to follow-up for new or worsening symptoms.     Further, in-person, evaluation may be necessary for continued treatment. Please follow up with your primary care doctor or specialist as needed. Verbally discussed plan.     Vertigo  -     meclizine (ANTIVERT) 25 mg tablet; Take 1 tablet (25 mg total) by mouth 3 (three) times daily as needed for Dizziness or Nausea.  Dispense: 15 tablet; Refill: 0    We appreciate you trusting us with your medical care. We hope you feel better soon. We will be happy to take care of you for all of your future medical needs.     You must understand that you've received Virtual treatment only and that you may be released before all your medical problems are known or treated. You,  the patient, will arrange for follow up care as instructed.     Follow up with your PCP or specialty clinic as directed in the next 1-2 weeks if not improved or as needed. You can call [phone] to schedule an appointment with the appropriate provider.     If your condition worsens we recommend that you receive another evaluation in person, with your primary care provider, urgent care or at the emergency room immediately or contact your primary medical clinics after hours call service to discuss your concerns.                     [1]   Current Outpatient Medications on File Prior to Visit   Medication Sig Dispense Refill    amLODIPine (NORVASC) 10 MG tablet       atorvastatin (LIPITOR) 80 MG tablet Take 80 mg by mouth every evening.      busPIRone (BUSPAR) 5 MG Tab Take 2.5-5 mg by mouth 2 (two) times daily.      gemfibroziL (LOPID) 600 MG tablet Take 600 mg by mouth 2 (two) times daily.      hydrALAZINE (APRESOLINE) 50 MG tablet Take 50 mg by mouth 2 (two) times daily.      predniSONE (DELTASONE) 20 MG tablet Take 1 tablet (20 mg total) by mouth once daily. for 3 days 3 tablet 0    sertraline (ZOLOFT) 100 MG tablet        No current facility-administered medications on file prior to visit.

## 2025-02-27 ENCOUNTER — ON-DEMAND VIRTUAL (OUTPATIENT)
Dept: URGENT CARE | Facility: CLINIC | Age: 50
End: 2025-02-27
Payer: MEDICAID

## 2025-02-27 DIAGNOSIS — M25.50 ARTHRALGIA, UNSPECIFIED JOINT: ICD-10-CM

## 2025-02-27 DIAGNOSIS — G89.29 CHRONIC PAIN OF RIGHT ANKLE: ICD-10-CM

## 2025-02-27 DIAGNOSIS — L03.115 CELLULITIS OF RIGHT ANKLE: Primary | ICD-10-CM

## 2025-02-27 DIAGNOSIS — M25.571 CHRONIC PAIN OF RIGHT ANKLE: ICD-10-CM

## 2025-02-27 DIAGNOSIS — R42 VERTIGO: Primary | ICD-10-CM

## 2025-02-27 RX ORDER — MELOXICAM 7.5 MG/1
7.5 TABLET ORAL DAILY
Qty: 30 TABLET | Refills: 1 | Status: SHIPPED | OUTPATIENT
Start: 2025-02-27

## 2025-02-27 RX ORDER — CEPHALEXIN 500 MG/1
500 CAPSULE ORAL EVERY 8 HOURS
Qty: 30 CAPSULE | Refills: 0 | Status: SHIPPED | OUTPATIENT
Start: 2025-02-27 | End: 2025-03-09

## 2025-02-27 NOTE — PROGRESS NOTES
Subjective:      Patient ID: Saba Potts is a 49 y.o. female.    Vitals:  vitals were not taken for this visit.     Chief Complaint: Ankle Pain      Visit Type: TELE AUDIOVISUAL    Past Medical History:   Diagnosis Date    Amenorrhea     Anxiety 1995    Depression     Hyperlipidemia     Hypertension     Stroke      Past Surgical History:   Procedure Laterality Date    DILATION OF URETER  1983    TONSILLECTOMY, ADENOIDECTOMY, BILATERAL MYRINGOTOMY AND TUBES       Review of patient's allergies indicates:  No Known Allergies  Medications Ordered Prior to Encounter[1]  Family History   Problem Relation Name Age of Onset    Breast cancer Paternal Grandmother          onset unknown    Hypertension Father Derek potts        Medications Ordered                [facility] [address]   [address] [address]    Telephone: [phone]   Fax: [phone]   Hours: Not open 24 hours                         E-Prescribed (2 of 2)              cephALEXin (KEFLEX) 500 MG capsule    Sig: Take 1 capsule (500 mg total) by mouth every 8 (eight) hours. for 10 days       Start: 2/27/25     Quantity: 30 capsule Refills: 0                         meloxicam (MOBIC) 7.5 MG tablet    Sig: Take 1 tablet (7.5 mg total) by mouth once daily.       Start: 2/27/25     Quantity: 30 tablet Refills: 1                           Ohs Peq Odvv Intake    2/27/2025  4:09 PM CST - Filed by Patient   What is your current physical address in the event of a medical emergency?    Are you able to take your vital signs? No   Please attach any relevant images or files    Is your employer contracted with [facility]? No         HPI     Ankle Pain     Additional comments: Swelling and erythema to right ankle x 1 day.  No fever.  Pt states that she has had swelling of the right ankle in the past as well as multiple other joints.  Denies injury.  Hx CVA.  Two patient identifiers were used-name was repeated  verbally as well as date of birth.  The patient was located in their home in the [facility].          Musculoskeletal:  Positive for joint pain and joint swelling.        Objective:   The physical exam was conducted virtually.  Physical Exam   Constitutional: She is oriented to person, place, and time. No distress.   HENT:   Head: Normocephalic and atraumatic.   Neck: Neck supple.   Pulmonary/Chest: Effort normal. No respiratory distress.   Abdominal: Normal appearance.   Musculoskeletal: Normal range of motion.         General: Swelling (right ankle) and tenderness (r ankle) present. Normal range of motion.   Neurological: no focal deficit. She is alert and oriented to person, place, and time.   Skin: Skin is not pale.   Psychiatric: Her behavior is normal. Mood, judgment and thought content normal.       Assessment:     1. Cellulitis of right ankle    2. Arthralgia, unspecified joint    3. Chronic pain of right ankle        Plan:       Cellulitis of right ankle    Arthralgia, unspecified joint  -     Ambulatory referral/consult to Orthopedics    Chronic pain of right ankle  -     Ambulatory referral/consult to Orthopedics    Other orders  -     meloxicam (MOBIC) 7.5 MG tablet; Take 1 tablet (7.5 mg total) by mouth once daily.  Dispense: 30 tablet; Refill: 1  -     cephALEXin (KEFLEX) 500 MG capsule; Take 1 capsule (500 mg total) by mouth every 8 (eight) hours. for 10 days  Dispense: 30 capsule; Refill: 0    Meds as above.  F/u in clinic if symptoms fail to resolve with treatment.    Referral to ortho for further inspection of chronic pain to right ankle.    You must understand that you've received a virtual Care treatment only and that you may be released before all your medical problems are known or treated. You, the patient, will arrange for follow up care as instructed.  If your condition worsens we recommend that you receive another evaluation at an urgent care in person, the emergency room or contact  your primary medical clinics after hours call service to discuss your concerns.              Present with the patient at the time of consultation: TELEMED PRESENT WITH PATIENT: None           [1]   Current Outpatient Medications on File Prior to Visit   Medication Sig Dispense Refill    amLODIPine (NORVASC) 10 MG tablet       atorvastatin (LIPITOR) 80 MG tablet Take 80 mg by mouth every evening.      busPIRone (BUSPAR) 5 MG Tab Take 2.5-5 mg by mouth 2 (two) times daily.      gemfibroziL (LOPID) 600 MG tablet Take 600 mg by mouth 2 (two) times daily.      hydrALAZINE (APRESOLINE) 50 MG tablet Take 50 mg by mouth 2 (two) times daily.      meclizine (ANTIVERT) 25 mg tablet Take 1 tablet (25 mg total) by mouth 3 (three) times daily as needed for Dizziness or Nausea. 15 tablet 0    sertraline (ZOLOFT) 100 MG tablet        No current facility-administered medications on file prior to visit.

## 2025-03-11 ENCOUNTER — ON-DEMAND VIRTUAL (OUTPATIENT)
Dept: URGENT CARE | Facility: CLINIC | Age: 50
End: 2025-03-11
Payer: MEDICAID

## 2025-03-11 DIAGNOSIS — R23.3 PETECHIAL RASH: Primary | ICD-10-CM

## 2025-03-11 NOTE — PROGRESS NOTES
Subjective:      Patient ID: Saba Mclaughlin is a 49 y.o. female.    Vitals:  vitals were not taken for this visit.     Chief Complaint: Skin Problem      Visit Type: TELE AUDIOVISUAL    Past Medical History:   Diagnosis Date    Amenorrhea     Anxiety 1995    Depression     Hyperlipidemia     Hypertension     Stroke      Past Surgical History:   Procedure Laterality Date    DILATION OF URETER  1983    TONSILLECTOMY, ADENOIDECTOMY, BILATERAL MYRINGOTOMY AND TUBES       Review of patient's allergies indicates:  No Known Allergies  Medications Ordered Prior to Encounter[1]  Family History   Problem Relation Name Age of Onset    Breast cancer Paternal Grandmother          onset unknown    Hypertension Father Derek mclaughlin            Ohs Peq Odvv Intake    3/11/2025  2:07 PM CDT - Filed by Patient   What is your current physical address in the event of a medical emergency? [address] [facility] [address]   Are you able to take your vital signs? No   Please attach any relevant images or files    Is your employer contracted with [facility]? No         Patient who I saw on virtual visit recently with cellulitis of the right ankle, completed course of keflex and is now better, returns with mild swelling, pain with weight bearing/ambulation and rash to LLE.  No fever.  Rash does not itch.    Two patient identifiers were used-name was repeated verbally as well as date of birth.  The patient was located at their workplace in the state [facility].        Musculoskeletal:  Positive for muscle ache.   Skin:  Positive for rash.      Objective:   The physical exam was conducted virtually.  Physical Exam   Constitutional: She is oriented to person, place, and time. No distress.   HENT:   Head: Normocephalic and atraumatic.   Neck: Neck supple.   Pulmonary/Chest: Effort normal. No respiratory distress.   Abdominal: Normal appearance.   Musculoskeletal: Normal range of motion.         General: Normal range of motion.    Neurological: no focal deficit. She is alert and oriented to person, place, and time.   Skin: Skin is not pale and rash (petichial type rash LLE below the knee, more pronounced posteriorly.).   Psychiatric: Her behavior is normal. Mood, judgment and thought content normal.     Assessment:     1. Petechial rash        Plan:       Petechial rash    Recommend in person appointment.            Present with the patient at the time of consultation: TELEMED PRESENT WITH PATIENT: None           [1]   Current Outpatient Medications on File Prior to Visit   Medication Sig Dispense Refill    amLODIPine (NORVASC) 10 MG tablet       atorvastatin (LIPITOR) 80 MG tablet Take 80 mg by mouth every evening.      busPIRone (BUSPAR) 5 MG Tab Take 2.5-5 mg by mouth 2 (two) times daily.      gemfibroziL (LOPID) 600 MG tablet Take 600 mg by mouth 2 (two) times daily.      hydrALAZINE (APRESOLINE) 50 MG tablet Take 50 mg by mouth 2 (two) times daily.      meclizine (ANTIVERT) 25 mg tablet Take 1 tablet (25 mg total) by mouth 3 (three) times daily as needed for Dizziness or Nausea. 15 tablet 0    meloxicam (MOBIC) 7.5 MG tablet Take 1 tablet (7.5 mg total) by mouth once daily. 30 tablet 1    sertraline (ZOLOFT) 100 MG tablet        No current facility-administered medications on file prior to visit.

## 2025-03-20 ENCOUNTER — OFFICE VISIT (OUTPATIENT)
Dept: OTOLARYNGOLOGY | Facility: CLINIC | Age: 50
End: 2025-03-20
Payer: MEDICAID

## 2025-03-20 DIAGNOSIS — R42 VERTIGO: ICD-10-CM

## 2025-03-20 DIAGNOSIS — H91.90 HEARING DISORDER, UNSPECIFIED LATERALITY: ICD-10-CM

## 2025-03-20 DIAGNOSIS — R42 DIZZINESS: Primary | ICD-10-CM

## 2025-03-20 RX ORDER — CETIRIZINE HYDROCHLORIDE 10 MG/1
10 TABLET ORAL
COMMUNITY
Start: 2025-02-17

## 2025-03-20 RX ORDER — FLUTICASONE PROPIONATE 50 MCG
SPRAY, SUSPENSION (ML) NASAL
COMMUNITY
Start: 2025-02-17

## 2025-03-20 NOTE — PROGRESS NOTES
"Audio Only Telehealth Visit     The patient location is: state of [place]  The chief complaint leading to consultation is: dizziness  Visit type: Virtual visit with audio only (telephone)  Total time spent on medical discussion: 15 min  Total time spent on visit: 20 min     The reason for the audio only service rather than synchronous audio and video virtual visit was related to technical difficulties or patient preference/necessity.     Each patient to whom I provide medical services by telemedicine is:  (1) informed of the relationship between the physician and patient and the respective role of any other health care provider with respect to management of the patient; and (2) notified that they may decline to receive medical services by telemedicine and may withdraw from such care at any time. Patient verbally consented to receive this service via voice-only telephone call.       HPI: 03/20/2025: 49 y.o. female referred for dizziness. This began about 3 years ago. States initially, her left ear was acting like it wanted to pop one day and the next day she woke up dizzy. Reports she also lost her left hearing during that time. She saw an outside audiologist who confirmed an essentially total loss, but she never saw an ENT. About three weeks ago, she had another dizzy episode which lasted 3 days. Dizziness is described as room spinning which seemed to be precipitated by movement. The first time it occurred, she rolled over in the bed. She is unable to say what other movements precipitated symptoms. Meclizine, closing her eyes, and being still are relieving factors. Reports she did have otologic infections and PE tubes when she was young. Denies tinnitus. Hx of "massive stroke" 4 years ago.      Assessment and plan:  49 y.o. female with dizziness & HL.   - Audio referral for baseline + vestibular workup  - Allison hydration  - Safety precautions  - Meclizine TID prn  - RTC 3mo    Missy Young NP      This service was not " originating from a related E/M service provided within the previous 7 days nor will  to an E/M service or procedure within the next 24 hours or my soonest available appointment.  Prevailing standard of care was able to be met in this audio-only visit.

## 2025-03-26 ENCOUNTER — DOCUMENTATION ONLY (OUTPATIENT)
Dept: AUDIOLOGY | Facility: HOSPITAL | Age: 50
End: 2025-03-26
Payer: MEDICAID

## 2025-03-26 NOTE — PROGRESS NOTES
Spoke with patient regarding VNG pre-test protocol and medication avoidance.  She reportedly is taking Meclizine which was advised to discontinue at least 48 hours prior to test date.  Other medications of minimal concern will be reported if taken upon arrival  (I.e antidepressant, muscle relaxer, etc) .  A history of otologic procedures and significant opthalmic procedures have been denied at this time.  It was advised that only a light breakfast be consumed the morning of testing as well.  Patient is a good candidate for VNG testing at this time.

## 2025-03-27 DIAGNOSIS — R70.0 ELEVATED SED RATE: Primary | ICD-10-CM

## 2025-03-31 ENCOUNTER — ON-DEMAND VIRTUAL (OUTPATIENT)
Dept: URGENT CARE | Facility: CLINIC | Age: 50
End: 2025-03-31
Payer: MEDICAID

## 2025-03-31 DIAGNOSIS — M32.9 SYSTEMIC LUPUS ERYTHEMATOSUS, UNSPECIFIED SLE TYPE, UNSPECIFIED ORGAN INVOLVEMENT STATUS: Primary | ICD-10-CM

## 2025-03-31 PROCEDURE — 98004 SYNCH AUDIO-VIDEO EST SF 10: CPT | Mod: 95,,,

## 2025-03-31 NOTE — PROGRESS NOTES
Subjective:      Patient ID: Saba Mclaughlin is a 49 y.o. female.    Vitals:  vitals were not taken for this visit.     Chief Complaint: rheumatologist appointmetn      Visit Type: TELE AUDIOVISUAL - This visit was conducted virtually based on  subjective information and limited objective exam    Present with the patient at the time of consultation: TELEMED PRESENT WITH PATIENT: None  LOCATION OF PATIENT [place]  Two patient identifiers used to verify patient- saying out date of birth and full name.       Past Medical History:   Diagnosis Date    Amenorrhea     Anxiety 1995    Depression     Hyperlipidemia     Hypertension     Stroke      Past Surgical History:   Procedure Laterality Date    DILATION OF URETER  1983    TONSILLECTOMY, ADENOIDECTOMY, BILATERAL MYRINGOTOMY AND TUBES       Review of patient's allergies indicates:  No Known Allergies  Medications Ordered Prior to Encounter[1]  Family History   Problem Relation Name Age of Onset    Breast cancer Paternal Grandmother          onset unknown    Hypertension Father Derek mclaughlin            Ohs Peq Odvv Intake    3/31/2025  8:21 AM CDT - Filed by Patient   What is your current physical address in the event of a medical emergency? [address] [place]   Are you able to take your vital signs? No   Please attach any relevant images or files    Is your employer contracted with [facility]? No         50 yo female dx with lupus last week and had a problem scheduling an appointment for rheumatologist.   Appt is set up in a year and was prescribed prednisone by PCP.   Reports feeling much better with meds and no acute complaints at this time.         Constitution: Negative for activity change, appetite change, chills and fever.   HENT:  Negative for sore throat and trouble swallowing.    Cardiovascular:  Negative for chest pain, palpitations and sob on exertion.   Respiratory:  Negative for COPD and shortness of breath.    Gastrointestinal:   Negative for nausea, vomiting, constipation and diarrhea.   Musculoskeletal:  Negative for pain.        Objective:   The physical exam was conducted virtually.    AAO x 3 ; no acute distress noted; appearance normal; mood and behavior normal; thought process normal  Head- normocephalic  Nose- appears normal, no discharge or erythema  Eyes- pupils appear normal in size, no drainage, no erythema  Ears- normal appearing; no discharge, no erythema  Mouth- appears normal  Oropharynx- no erythema, lesions  Lungs- breathing at a normal rate, no acute distress noted  Heart- no reports of tachycardia, palpitations, chest pain  Abdomen- non distended, non tender reported by patient  Skin- warm and dry, no erythema or edema noted by patient or visualized  Psych- as above; no si/hi      Assessment:     1. Systemic lupus erythematosus, unspecified SLE type, unspecified organ involvement status        Plan:         Thank you for choosing [facility]!    Our goal in the [facility] is to always provide outstanding medical care. You may receive a survey by mail or e-mail in the next week regarding your experience today. We would greatly appreciate you completing and returning the survey. Your feedback provides us with a way to recognize our staff who provide very good care, and it helps us learn how to improve when your experience was below our aspiration of excellence.         We appreciate you trusting us with your medical care. We hope you feel better soon. We will be happy to take care of you for all of your future medical needs.    You must understand that you've received an Urgent Care treatment only and that you may be released before all your medical problems are known or treated. You, the patient, will arrange for follow up care as instructed.    Follow up with your PCP or specialty clinic as directed in the next 1-2 weeks if not improved or as needed.  You can call [phone] to  schedule an appointment with the appropriate provider.    If your condition worsens we recommend that you receive another evaluation in person, with your primary care provider, urgent care or at the emergency room immediately or contact your primary medical clinics after hours call service to discuss your concerns.         Systemic lupus erythematosus, unspecified SLE type, unspecified organ involvement status  Comments:  advised to continue prednisone as perscibed  follow up with PCP office to speak to referral specialist to schedule appt with rheum  can call rheum office to be put on wait list.                  [1]   Current Outpatient Medications on File Prior to Visit   Medication Sig Dispense Refill    amLODIPine (NORVASC) 10 MG tablet       atorvastatin (LIPITOR) 80 MG tablet Take 80 mg by mouth every evening.      busPIRone (BUSPAR) 5 MG Tab Take 2.5-5 mg by mouth 2 (two) times daily.      cetirizine (ZYRTEC) 10 MG tablet Take 10 mg by mouth.      fluticasone propionate (FLONASE) 50 mcg/actuation nasal spray SMARTSI-2 Spray(s) Both Nares Daily PRN      gemfibroziL (LOPID) 600 MG tablet Take 600 mg by mouth 2 (two) times daily.      hydrALAZINE (APRESOLINE) 50 MG tablet Take 50 mg by mouth 2 (two) times daily.      meclizine (ANTIVERT) 25 mg tablet Take 1 tablet (25 mg total) by mouth 3 (three) times daily as needed for Dizziness or Nausea. 15 tablet 0    meloxicam (MOBIC) 7.5 MG tablet Take 1 tablet (7.5 mg total) by mouth once daily. (Patient not taking: Reported on 3/20/2025) 30 tablet 1    sertraline (ZOLOFT) 100 MG tablet        No current facility-administered medications on file prior to visit.

## 2025-04-10 ENCOUNTER — ON-DEMAND VIRTUAL (OUTPATIENT)
Dept: URGENT CARE | Facility: CLINIC | Age: 50
End: 2025-04-10
Payer: MEDICAID

## 2025-04-10 DIAGNOSIS — M32.9 SYSTEMIC LUPUS ERYTHEMATOSUS, UNSPECIFIED SLE TYPE, UNSPECIFIED ORGAN INVOLVEMENT STATUS: Primary | ICD-10-CM

## 2025-04-10 RX ORDER — PREDNISONE 20 MG/1
20 TABLET ORAL DAILY
Qty: 5 TABLET | Refills: 0 | Status: SHIPPED | OUTPATIENT
Start: 2025-04-10 | End: 2025-04-15

## 2025-04-10 NOTE — PROGRESS NOTES
Subjective:      Patient ID: Saba Potts is a 49 y.o. female.    Vitals:  vitals were not taken for this visit.     Chief Complaint: lupus flare      Visit Type: TELE AUDIOVISUAL - This visit was conducted virtually based on  subjective information and limited objective exam    Present with the patient at the time of consultation: TELEMED PRESENT WITH PATIENT: None  LOCATION OF PATIENT [facility]  Two patient identifiers used to verify patient- saying out date of birth and full name.       Past Medical History:   Diagnosis Date    Amenorrhea     Anxiety 1995    Depression     Hyperlipidemia     Hypertension     Stroke      Past Surgical History:   Procedure Laterality Date    DILATION OF URETER  1983    TONSILLECTOMY, ADENOIDECTOMY, BILATERAL MYRINGOTOMY AND TUBES       Review of patient's allergies indicates:  No Known Allergies  Medications Ordered Prior to Encounter[1]  Family History   Problem Relation Name Age of Onset    Breast cancer Paternal Grandmother          onset unknown    Hypertension Father Derek potts        Medications Ordered                [facility] [place] - [address]   [address] [address]    Telephone: [phone]   Fax: [phone]   Hours: Not open 24 hours                         E-Prescribed (1 of 1)              predniSONE (DELTASONE) 20 MG tablet    Sig: Take 1 tablet (20 mg total) by mouth once daily. for 5 days       Start: 4/10/25     Quantity: 5 tablet Refills: 0                           Ohs Peq Odvv Intake    4/10/2025  3:44 PM CDT - Filed by Patient   What is your current physical address in the event of a medical emergency? [address] [place]   Are you able to take your vital signs? No   Please attach any relevant images or files    Is your employer contracted with [facility]? No         50 yo female reports diagnosed with lupus 2 weeks ago, been having flare up since December and treated with prednisone. Reports  current pain of all joints.   Can't see rheumatologist till next January and February 2026.   Denies fever, chills, CP, SOB, weakness, fatigue, N/V/D/C.         Constitution: Negative for activity change, appetite change, chills and fever.   Cardiovascular:  Negative for chest pain, leg swelling and palpitations.   Respiratory:  Negative for shortness of breath.    Musculoskeletal:  Positive for joint pain, joint swelling, muscle cramps and muscle ache. Negative for back pain.   Skin:  Negative for rash.        Objective:   The physical exam was conducted virtually.    Pt is alert and oriented.  Speech is clear and coherent.  Speaking in complete sentences.  No distress.  Mood and affect are normal.     Limited physical exam due to virtual visit.       Assessment:     1. Systemic lupus erythematosus, unspecified SLE type, unspecified organ involvement status        Plan:         Thank you for choosing [facility]!    Our goal in the [facility] is to always provide outstanding medical care. You may receive a survey by mail or e-mail in the next week regarding your experience today. We would greatly appreciate you completing and returning the survey. Your feedback provides us with a way to recognize our staff who provide very good care, and it helps us learn how to improve when your experience was below our aspiration of excellence.         We appreciate you trusting us with your medical care. We hope you feel better soon. We will be happy to take care of you for all of your future medical needs.    You must understand that you've received an Urgent Care treatment only and that you may be released before all your medical problems are known or treated. You, the patient, will arrange for follow up care as instructed.    Follow up with your PCP or specialty clinic as directed in the next 1-2 weeks if not improved or as needed.  You can call [phone] to schedule an appointment  with the appropriate provider.    If your condition worsens we recommend that you receive another evaluation in person, with your primary care provider, urgent care or at the emergency room immediately or contact your primary medical clinics after hours call service to discuss your concerns.         Systemic lupus erythematosus, unspecified SLE type, unspecified organ involvement status  -     predniSONE (DELTASONE) 20 MG tablet; Take 1 tablet (20 mg total) by mouth once daily. for 5 days  Dispense: 5 tablet; Refill: 0                         [1]   Current Outpatient Medications on File Prior to Visit   Medication Sig Dispense Refill    amLODIPine (NORVASC) 10 MG tablet       atorvastatin (LIPITOR) 80 MG tablet Take 80 mg by mouth every evening.      busPIRone (BUSPAR) 5 MG Tab Take 2.5-5 mg by mouth 2 (two) times daily.      cetirizine (ZYRTEC) 10 MG tablet Take 10 mg by mouth.      fluticasone propionate (FLONASE) 50 mcg/actuation nasal spray SMARTSI-2 Spray(s) Both Nares Daily PRN      gemfibroziL (LOPID) 600 MG tablet Take 600 mg by mouth 2 (two) times daily.      hydrALAZINE (APRESOLINE) 50 MG tablet Take 50 mg by mouth 2 (two) times daily.      meclizine (ANTIVERT) 25 mg tablet Take 1 tablet (25 mg total) by mouth 3 (three) times daily as needed for Dizziness or Nausea. 15 tablet 0    meloxicam (MOBIC) 7.5 MG tablet Take 1 tablet (7.5 mg total) by mouth once daily. (Patient not taking: Reported on 3/20/2025) 30 tablet 1    sertraline (ZOLOFT) 100 MG tablet        No current facility-administered medications on file prior to visit.

## 2025-06-14 ENCOUNTER — ON-DEMAND VIRTUAL (OUTPATIENT)
Dept: URGENT CARE | Facility: CLINIC | Age: 50
End: 2025-06-14
Payer: MEDICAID

## 2025-06-14 DIAGNOSIS — M32.9 RASH DUE TO SYSTEMIC LUPUS ERYTHEMATOSUS (SLE): Primary | ICD-10-CM

## 2025-06-14 PROCEDURE — 98005 SYNCH AUDIO-VIDEO EST LOW 20: CPT | Mod: 95,,, | Performed by: NURSE PRACTITIONER

## 2025-06-14 RX ORDER — PREDNISONE 20 MG/1
20 TABLET ORAL DAILY
Qty: 5 TABLET | Refills: 0 | Status: SHIPPED | OUTPATIENT
Start: 2025-06-14 | End: 2025-06-19

## 2025-06-14 NOTE — PROGRESS NOTES
Subjective:      Patient ID: Saba Potts is a 49 y.o. female.    Vitals:  vitals were not taken for this visit.     Chief Complaint: Rash      Visit Type: TELE AUDIOVISUAL - This visit was conducted virtually based on  subjective information and limited objective exam    Present with the patient at the time of consultation: TELEMED PRESENT WITH PATIENT: None  LOCATION OF PATIENT [place]  Two patient identifiers used to verify patient- saying out date of birth and full name.       Past Medical History:   Diagnosis Date    Amenorrhea     Anxiety 1995    Depression     Hyperlipidemia     Hypertension     Stroke      Past Surgical History:   Procedure Laterality Date    DILATION OF URETER  1983    TONSILLECTOMY, ADENOIDECTOMY, BILATERAL MYRINGOTOMY AND TUBES       Review of patient's allergies indicates:  No Known Allergies  Medications Ordered Prior to Encounter[1]  Family History   Problem Relation Name Age of Onset    Breast cancer Paternal Grandmother          onset unknown    Hypertension Father Derek potts        Medications Ordered                [facility] - [address]   [address] [address]    Telephone: [phone]   Fax: [phone]   Hours: Not open 24 hours                         E-Prescribed (1 of 1)              predniSONE (DELTASONE) 20 MG tablet    Sig: Take 1 tablet (20 mg total) by mouth once daily. for 5 days       Start: 6/14/25     Quantity: 5 tablet Refills: 0                           No questionnaires on file.    50 yo female with c/o lupus rash to legs. She states she can not see a rheumatologist until end of year and keeps getting a red rash to lower legs. No warmth no drainage. She takes 5 mg of prednisone daily        Constitution: Negative.   HENT: Negative.     Cardiovascular: Negative.    Respiratory: Negative.     Gastrointestinal: Negative.    Endocrine: negative.   Genitourinary: Negative.  Negative for frequency and urgency.    Musculoskeletal: Negative.    Skin: Negative.    Allergic/Immunologic: Negative.    Neurological: Negative.    Hematologic/Lymphatic: Negative.    Psychiatric/Behavioral: Negative.          Objective:   The physical exam was conducted virtually.    AAO x 3 ; no acute distress noted; appearance normal; mood and behavior normal; thought process normal  Head- normocephalic  Nose- appears normal, no discharge or erythema  Eyes- pupils appear normal in size, no drainage, no erythema  Ears- normal appearing; no discharge, no erythema  Mouth- appears normal  Oropharynx- no erythema, lesions  Lungs- breathing at a normal rate, no acute distress noted  Heart- no reports of tachycardia, palpitations, chest pain  Abdomen- non distended, non tender reported by patient  Skin- warm and dry, no erythema or edema noted by patient or visualized  Psych- as above; no si/hi      Assessment:     1. Rash due to systemic lupus erythematosus (SLE)        Plan:       Will send in prednisone 20 x 5 days and advised f/u with pcp  Until can see rheumatology      Thank you for choosing [facility]!    Our goal in the [facility] is to always provide outstanding medical care. You may receive a survey by mail or e-mail in the next week regarding your experience today. We would greatly appreciate you completing and returning the survey. Your feedback provides us with a way to recognize our staff who provide very good care, and it helps us learn how to improve when your experience was below our aspiration of excellence.         We appreciate you trusting us with your medical care. We hope you feel better soon. We will be happy to take care of you for all of your future medical needs.    You must understand that you've received an Urgent Care treatment only and that you may be released before all your medical problems are known or treated. You, the patient, will arrange for follow up care as  instructed.    Follow up with your PCP or specialty clinic as directed in the next 1-2 weeks if not improved or as needed.  You can call [phone] to schedule an appointment with the appropriate provider.    If your condition worsens we recommend that you receive another evaluation in person, with your primary care provider, urgent care or at the emergency room immediately or contact your primary medical clinics after hours call service to discuss your concerns.         Rash due to systemic lupus erythematosus (SLE)  -     predniSONE (DELTASONE) 20 MG tablet; Take 1 tablet (20 mg total) by mouth once daily. for 5 days  Dispense: 5 tablet; Refill: 0                         [1]   Current Outpatient Medications on File Prior to Visit   Medication Sig Dispense Refill    amLODIPine (NORVASC) 10 MG tablet       atorvastatin (LIPITOR) 80 MG tablet Take 80 mg by mouth every evening.      busPIRone (BUSPAR) 5 MG Tab Take 2.5-5 mg by mouth 2 (two) times daily.      cetirizine (ZYRTEC) 10 MG tablet Take 10 mg by mouth.      fluticasone propionate (FLONASE) 50 mcg/actuation nasal spray SMARTSI-2 Spray(s) Both Nares Daily PRN      gemfibroziL (LOPID) 600 MG tablet Take 600 mg by mouth 2 (two) times daily.      hydrALAZINE (APRESOLINE) 50 MG tablet Take 50 mg by mouth 2 (two) times daily.      meclizine (ANTIVERT) 25 mg tablet Take 1 tablet (25 mg total) by mouth 3 (three) times daily as needed for Dizziness or Nausea. 15 tablet 0    meloxicam (MOBIC) 7.5 MG tablet Take 1 tablet (7.5 mg total) by mouth once daily. (Patient not taking: Reported on 3/20/2025) 30 tablet 1    sertraline (ZOLOFT) 100 MG tablet        No current facility-administered medications on file prior to visit.

## 2025-07-07 ENCOUNTER — TELEPHONE (OUTPATIENT)
Dept: OBSTETRICS AND GYNECOLOGY | Facility: CLINIC | Age: 50
End: 2025-07-07
Payer: MEDICAID

## 2025-07-07 DIAGNOSIS — Z12.39 ENCOUNTER FOR SCREENING FOR MALIGNANT NEOPLASM OF BREAST, UNSPECIFIED SCREENING MODALITY: Primary | ICD-10-CM

## 2025-07-07 RX ORDER — FLUCONAZOLE 200 MG/1
200 TABLET ORAL EVERY OTHER DAY
Qty: 4 TABLET | Refills: 0 | Status: SHIPPED | OUTPATIENT
Start: 2025-07-07 | End: 2025-07-14

## 2025-07-07 NOTE — TELEPHONE ENCOUNTER
----- Message from Mary sent at 7/7/2025  1:06 PM CDT -----  Regarding: Med request  .Type:  Needs Medical Advice    Who Called:  patient  Best Call Back Number:  [phone]  Additional Information:  called requesting meds for a yeast infection being that she was diagnosed with Lupus and is on prednisone- sent to [facility]

## 2025-07-08 ENCOUNTER — ON-DEMAND VIRTUAL (OUTPATIENT)
Dept: URGENT CARE | Facility: CLINIC | Age: 50
End: 2025-07-08
Payer: MEDICAID

## 2025-07-08 DIAGNOSIS — M32.9 SYSTEMIC LUPUS ERYTHEMATOSUS, UNSPECIFIED SLE TYPE, UNSPECIFIED ORGAN INVOLVEMENT STATUS: Primary | ICD-10-CM

## 2025-07-08 PROCEDURE — 98005 SYNCH AUDIO-VIDEO EST LOW 20: CPT | Mod: 95,,, | Performed by: PHYSICIAN ASSISTANT

## 2025-07-08 NOTE — PATIENT INSTRUCTIONS
Thank you for choosing [facility]!    Our goal in the [facility] to always provide outstanding medical care. You may receive a survey by mail or e-mail in the next week regarding your experience today. We would greatly appreciate you completing and returning the survey. Your feedback provides us with a way to recognize our staff who provide very good care, and it helps us learn how to improve when your experience was below our aspiration of excellence.         We appreciate you trusting us with your medical care. We hope you feel better soon. We will be happy to take care of you for all of your future medical needs.    You must understand that you've received Virtual  treatment only and that you may be released before all your medical problems are known or treated. You, the patient, will arrange for follow up care as instructed.    Follow up with your PCP or specialty clinic as directed in the next 1-2 weeks if not improved or as needed.  You can call [phone] to schedule an appointment with the appropriate provider.    If your condition worsens we recommend that you receive another evaluation in person, with your primary care provider, urgent care or at the emergency room immediately or contact your primary medical clinics after hours call service to discuss your concerns.

## 2025-07-08 NOTE — PROGRESS NOTES
"  Subjective:      Patient ID: Saba Mclaughlin is a 49 y.o. female.    Vitals:  vitals were not taken for this visit.     Chief Complaint: Muscle Pain (C/O muscle and joint pain. Pt states she was dx with Lupus in February 2025. Pt has questions about the medications she is taking, whether is is "creating lupus".)      Visit Type: TELE AUDIOVISUAL    Patient Location: Home     Present with the patient at the time of consultation: TELEMED PRESENT WITH PATIENT: None    Past Medical History:   Diagnosis Date    Amenorrhea     Anxiety 1995    Depression     Hyperlipidemia     Hypertension     Stroke      Past Surgical History:   Procedure Laterality Date    DILATION OF URETER  1983    TONSILLECTOMY, ADENOIDECTOMY, BILATERAL MYRINGOTOMY AND TUBES       Review of patient's allergies indicates:  No Known Allergies  Medications Ordered Prior to Encounter[1]  Family History   Problem Relation Name Age of Onset    Breast cancer Paternal Grandmother          onset unknown    Hypertension Father Derek mclaughlin            [facility] Intake    7/8/2025 10:32 AM CDT - Filed by Patient   What is your current physical address in the event of a medical emergency? [address] [place]   Are you able to take your vital signs? No   Please attach any relevant images or files    Is your employer contracted with [facility]? No         C/O muscle and joint pain. Pt states she was dx with Lupus in February 2025. Pt has questions about the medications she is taking, whether is is "creating lupus". Hx of stroke four years ago. Has not yet seen rheumatology. Is on prednisone but knows she should not be on it indefinitely.     Muscle Pain  Associated symptoms include arthralgias.       Musculoskeletal:  Positive for joint pain.        Objective:   The physical exam was conducted virtually.  Physical Exam   Abdominal: Normal appearance.   Neurological: She is alert.       Assessment:     1. Systemic lupus erythematosus, " unspecified SLE type, unspecified organ involvement status        Plan:       Systemic lupus erythematosus, unspecified SLE type, unspecified organ involvement status      Discussed that there are meds for lupus that are not steroids, but she needs further workup by rheumatology before starting them. If breakthrough sxs are severe go to ER.                     [1]   Current Outpatient Medications on File Prior to Visit   Medication Sig Dispense Refill    amLODIPine (NORVASC) 10 MG tablet       atorvastatin (LIPITOR) 80 MG tablet Take 80 mg by mouth every evening.      busPIRone (BUSPAR) 5 MG Tab Take 2.5-5 mg by mouth 2 (two) times daily.      cetirizine (ZYRTEC) 10 MG tablet Take 10 mg by mouth.      fluconazole (DIFLUCAN) 200 MG Tab Take 1 tablet (200 mg total) by mouth every other day. for 4 doses 4 tablet 0    fluticasone propionate (FLONASE) 50 mcg/actuation nasal spray SMARTSI-2 Spray(s) Both Nares Daily PRN      gemfibroziL (LOPID) 600 MG tablet Take 600 mg by mouth 2 (two) times daily.      hydrALAZINE (APRESOLINE) 50 MG tablet Take 50 mg by mouth 2 (two) times daily.      meloxicam (MOBIC) 7.5 MG tablet Take 1 tablet (7.5 mg total) by mouth once daily. 30 tablet 1    sertraline (ZOLOFT) 100 MG tablet       meclizine (ANTIVERT) 25 mg tablet Take 1 tablet (25 mg total) by mouth 3 (three) times daily as needed for Dizziness or Nausea. 15 tablet 0     No current facility-administered medications on file prior to visit.

## 2025-07-15 ENCOUNTER — ON-DEMAND VIRTUAL (OUTPATIENT)
Dept: URGENT CARE | Facility: CLINIC | Age: 50
End: 2025-07-15
Payer: MEDICAID

## 2025-07-15 DIAGNOSIS — R22.0 SWELLING OF UPPER LIP: Primary | ICD-10-CM

## 2025-07-15 PROCEDURE — 98005 SYNCH AUDIO-VIDEO EST LOW 20: CPT | Mod: 95,,, | Performed by: NURSE PRACTITIONER

## 2025-07-15 RX ORDER — PREDNISONE 20 MG/1
20 TABLET ORAL DAILY
Qty: 3 TABLET | Refills: 0 | Status: SHIPPED | OUTPATIENT
Start: 2025-07-15 | End: 2025-07-18

## 2025-07-15 NOTE — PATIENT INSTRUCTIONS

## 2025-07-15 NOTE — PROGRESS NOTES
Subjective:      Patient ID: Saba Potts is a 49 y.o. female.    Vitals:  vitals were not taken for this visit.     Chief Complaint: Facial Swelling      Visit Type: TELE AUDIOVISUAL    Patient Location: [facility]    Present with the patient at the time of consultation: TELEMED PRESENT WITH PATIENT: None    Past Medical History:   Diagnosis Date    Amenorrhea     Anxiety 1995    Depression     Hyperlipidemia     Hypertension     Stroke      Past Surgical History:   Procedure Laterality Date    DILATION OF URETER  1983    TONSILLECTOMY, ADENOIDECTOMY, BILATERAL MYRINGOTOMY AND TUBES       Review of patient's allergies indicates:  No Known Allergies  Medications Ordered Prior to Encounter[1]  Family History   Problem Relation Name Age of Onset    Breast cancer Paternal Grandmother          onset unknown    Hypertension Father Derek potts        Medications Ordered                [facility] - [address]   [address] [address]    Telephone: [phone]   Fax: [phone]   Hours: Not open 24 hours                         E-Prescribed (1 of 1)              predniSONE (DELTASONE) 20 MG tablet    Sig: Take 1 tablet (20 mg total) by mouth once daily. for 3 days       Start: 7/15/25     Quantity: 3 tablet Refills: 0                           [facility] Intake    7/15/2025  8:45 AM CDT - Filed by Patient   What is your current physical address in the event of a medical emergency? [address] [place]   Are you able to take your vital signs? No   Please attach any relevant images or files    Is your employer contracted with [facility]? No         Pt resents with c/o lip swelling started on last night.   Denies difficulty swallowing, denies CP, SOB, dizziness, ha.   Recently dx with lupus, however unable to see Rheumatology until October 2025.  Currently on Prednisone 5mg daily .         Constitution: Negative for fever.   HENT:  Positive for facial  swelling (top lip swelling). Negative for drooling, sore throat and trouble swallowing.    Cardiovascular:  Negative for chest pain.   Respiratory:  Negative for shortness of breath.    Neurological:  Negative for dizziness, speech difficulty and headaches.        Objective:   The physical exam was conducted virtually.  Physical Exam   Constitutional: She is oriented to person, place, and time. No distress.   HENT:   Head: Normocephalic.   Ears:   Right Ear: External ear normal.   Left Ear: External ear normal.   Nose: No rhinorrhea or congestion.   Mouth/Throat:       Top lip swelling noted       Comments: Top lip swelling noted   Eyes: Conjunctivae are normal.   Neck: Neck supple.   Pulmonary/Chest: Effort normal. No respiratory distress.   Neurological: She is alert and oriented to person, place, and time.   Skin: Skin is no rash.       Assessment:     1. Swelling of upper lip        Plan:   Not sure of the cause of the top lip swelling,   Pt denies difficulty breathing or swallowing.     ER precautions discussed, pt verbalized understanding.    Will stop for the next 3 days prednisone 5mg daily, and start prednisone 20mg mg daily x 3 days.    Pt may need follow up in person visit with PCP    Patient encouraged to monitor symptoms closely and instructed to follow-up for new or worsening symptoms. Further, in-person, evaluation may be necessary for continued treatment.     Please follow up with your primary care doctor or specialist as needed. Verbally discussed plan      Swelling of upper lip  -     predniSONE (DELTASONE) 20 MG tablet; Take 1 tablet (20 mg total) by mouth once daily. for 3 days  Dispense: 3 tablet; Refill: 0      We appreciate you trusting us with your medical care. We hope you feel better soon. We will be happy to take care of you for all of your future medical needs.     You must understand that you've received Virtual treatment only and that you may be released before all your medical problems  are known or treated. You, the patient, will arrange for follow up care as instructed.     Follow up with your PCP or specialty clinic as directed in the next 1-2 weeks if not improved or as needed. You can call [phone] to schedule an appointment with the appropriate provider.     If your condition worsens we recommend that you receive another evaluation in person, with your primary care provider, urgent care or at the emergency room immediately or contact your primary medical clinics after hours call service to discuss your concerns.                   [1]   Current Outpatient Medications on File Prior to Visit   Medication Sig Dispense Refill    amLODIPine (NORVASC) 10 MG tablet       atorvastatin (LIPITOR) 80 MG tablet Take 80 mg by mouth every evening.      busPIRone (BUSPAR) 5 MG Tab Take 2.5-5 mg by mouth 2 (two) times daily.      cetirizine (ZYRTEC) 10 MG tablet Take 10 mg by mouth.      [] fluconazole (DIFLUCAN) 200 MG Tab Take 1 tablet (200 mg total) by mouth every other day. for 4 doses 4 tablet 0    fluticasone propionate (FLONASE) 50 mcg/actuation nasal spray SMARTSI-2 Spray(s) Both Nares Daily PRN      gemfibroziL (LOPID) 600 MG tablet Take 600 mg by mouth 2 (two) times daily.      hydrALAZINE (APRESOLINE) 50 MG tablet Take 50 mg by mouth 2 (two) times daily.      meclizine (ANTIVERT) 25 mg tablet Take 1 tablet (25 mg total) by mouth 3 (three) times daily as needed for Dizziness or Nausea. 15 tablet 0    meloxicam (MOBIC) 7.5 MG tablet Take 1 tablet (7.5 mg total) by mouth once daily. 30 tablet 1    sertraline (ZOLOFT) 100 MG tablet        No current facility-administered medications on file prior to visit.

## 2025-07-15 NOTE — Clinical Note
Please contact pt for in person visit, as noted Virtual visit today with swelling of the lip. Prednisone adjusted, see note.   Thanks,  Kia Ulloa NP